# Patient Record
Sex: FEMALE | Race: WHITE | NOT HISPANIC OR LATINO | Employment: OTHER | URBAN - METROPOLITAN AREA
[De-identification: names, ages, dates, MRNs, and addresses within clinical notes are randomized per-mention and may not be internally consistent; named-entity substitution may affect disease eponyms.]

---

## 2017-03-28 ENCOUNTER — APPOINTMENT (EMERGENCY)
Dept: RADIOLOGY | Facility: HOSPITAL | Age: 67
End: 2017-03-28
Payer: MEDICARE

## 2017-03-28 ENCOUNTER — APPOINTMENT (EMERGENCY)
Dept: CT IMAGING | Facility: HOSPITAL | Age: 67
End: 2017-03-28
Payer: MEDICARE

## 2017-03-28 ENCOUNTER — HOSPITAL ENCOUNTER (EMERGENCY)
Facility: HOSPITAL | Age: 67
Discharge: HOME | End: 2017-03-28
Attending: EMERGENCY MEDICINE | Admitting: EMERGENCY MEDICINE
Payer: MEDICARE

## 2017-03-28 VITALS
HEIGHT: 64 IN | WEIGHT: 165 LBS | DIASTOLIC BLOOD PRESSURE: 53 MMHG | SYSTOLIC BLOOD PRESSURE: 100 MMHG | RESPIRATION RATE: 12 BRPM | HEART RATE: 79 BPM | OXYGEN SATURATION: 96 % | TEMPERATURE: 100.3 F | BODY MASS INDEX: 28.17 KG/M2

## 2017-03-28 DIAGNOSIS — N39.0 ACUTE UTI: Primary | ICD-10-CM

## 2017-03-28 LAB
ALBUMIN SERPL BCP-MCNC: 3.4 G/DL (ref 3.5–5)
ALP SERPL-CCNC: 55 U/L (ref 46–116)
ALT SERPL W P-5'-P-CCNC: 32 U/L (ref 12–78)
ANION GAP SERPL CALCULATED.3IONS-SCNC: 10 MMOL/L (ref 4–13)
AST SERPL W P-5'-P-CCNC: 19 U/L (ref 5–45)
ATRIAL RATE: 82 BPM
BACTERIA UR QL AUTO: ABNORMAL /HPF
BASOPHILS # BLD AUTO: 0.02 THOUSANDS/ΜL (ref 0–0.1)
BASOPHILS NFR BLD AUTO: 0 % (ref 0–1)
BILIRUB DIRECT SERPL-MCNC: 0.24 MG/DL (ref 0–0.2)
BILIRUB SERPL-MCNC: 1 MG/DL (ref 0.2–1)
BILIRUB UR QL STRIP: NEGATIVE
BUN SERPL-MCNC: 9 MG/DL (ref 5–25)
CALCIUM SERPL-MCNC: 8.5 MG/DL (ref 8.3–10.1)
CHLORIDE SERPL-SCNC: 99 MMOL/L (ref 100–108)
CLARITY UR: ABNORMAL
CO2 SERPL-SCNC: 26 MMOL/L (ref 21–32)
COLOR UR: YELLOW
COLOR, POC: ABNORMAL
CREAT SERPL-MCNC: 0.61 MG/DL (ref 0.6–1.3)
EOSINOPHIL # BLD AUTO: 0 THOUSAND/ΜL (ref 0–0.61)
EOSINOPHIL NFR BLD AUTO: 0 % (ref 0–6)
ERYTHROCYTE [DISTWIDTH] IN BLOOD BY AUTOMATED COUNT: 12.5 % (ref 11.6–15.1)
EXT BILIRUBIN, UA: NEGATIVE
EXT BLOOD URINE: ABNORMAL
EXT GLUCOSE, UA: NEGATIVE
EXT KETONES: ABNORMAL
EXT NITRITE, UA: NEGATIVE
EXT PH, UA: 6
EXT PROTEIN, UA: ABNORMAL
EXT SPECIFIC GRAVITY, UA: 1.02
EXT UROBILINOGEN: 0.2
GFR SERPL CREATININE-BSD FRML MDRD: >60 ML/MIN/1.73SQ M
GLUCOSE SERPL-MCNC: 100 MG/DL (ref 65–140)
GLUCOSE UR STRIP-MCNC: NEGATIVE MG/DL
HCT VFR BLD AUTO: 41.9 % (ref 34.8–46.1)
HGB BLD-MCNC: 14.8 G/DL (ref 11.5–15.4)
HGB UR QL STRIP.AUTO: ABNORMAL
KETONES UR STRIP-MCNC: ABNORMAL MG/DL
LEUKOCYTE ESTERASE UR QL STRIP: NEGATIVE
LYMPHOCYTES # BLD AUTO: 0.61 THOUSANDS/ΜL (ref 0.6–4.47)
LYMPHOCYTES NFR BLD AUTO: 8 % (ref 14–44)
MCH RBC QN AUTO: 35.2 PG (ref 26.8–34.3)
MCHC RBC AUTO-ENTMCNC: 35.3 G/DL (ref 31.4–37.4)
MCV RBC AUTO: 100 FL (ref 82–98)
MONOCYTES # BLD AUTO: 0.7 THOUSAND/ΜL (ref 0.17–1.22)
MONOCYTES NFR BLD AUTO: 9 % (ref 4–12)
NEUTROPHILS # BLD AUTO: 6.79 THOUSANDS/ΜL (ref 1.85–7.62)
NEUTS SEG NFR BLD AUTO: 83 % (ref 43–75)
NITRITE UR QL STRIP: POSITIVE
NON-SQ EPI CELLS URNS QL MICRO: ABNORMAL /HPF
NRBC BLD AUTO-RTO: 0 /100 WBCS
P AXIS: 58 DEGREES
PH UR STRIP.AUTO: 6 [PH] (ref 4.5–8)
PLATELET # BLD AUTO: 156 THOUSANDS/UL (ref 149–390)
PMV BLD AUTO: 10.4 FL (ref 8.9–12.7)
POTASSIUM SERPL-SCNC: 3.6 MMOL/L (ref 3.5–5.3)
PR INTERVAL: 176 MS
PROT SERPL-MCNC: 6.9 G/DL (ref 6.4–8.2)
PROT UR STRIP-MCNC: NEGATIVE MG/DL
QRS AXIS: -7 DEGREES
QRSD INTERVAL: 104 MS
QT INTERVAL: 346 MS
QTC INTERVAL: 404 MS
RBC # BLD AUTO: 4.21 MILLION/UL (ref 3.81–5.12)
RBC #/AREA URNS AUTO: ABNORMAL /HPF
SODIUM SERPL-SCNC: 135 MMOL/L (ref 136–145)
SP GR UR STRIP.AUTO: 1.02 (ref 1–1.03)
T WAVE AXIS: 61 DEGREES
TROPONIN I SERPL-MCNC: <0.02 NG/ML
UROBILINOGEN UR QL STRIP.AUTO: 0.2 E.U./DL
VENTRICULAR RATE: 82 BPM
WBC # BLD AUTO: 8.16 THOUSAND/UL (ref 4.31–10.16)
WBC # BLD EST: ABNORMAL 10*3/UL
WBC #/AREA URNS AUTO: ABNORMAL /HPF
WBC CLUMPS # UR AUTO: PRESENT /UL

## 2017-03-28 PROCEDURE — 81002 URINALYSIS NONAUTO W/O SCOPE: CPT | Performed by: PHYSICIAN ASSISTANT

## 2017-03-28 PROCEDURE — 87086 URINE CULTURE/COLONY COUNT: CPT | Performed by: PHYSICIAN ASSISTANT

## 2017-03-28 PROCEDURE — 87186 SC STD MICRODIL/AGAR DIL: CPT | Performed by: PHYSICIAN ASSISTANT

## 2017-03-28 PROCEDURE — 93005 ELECTROCARDIOGRAM TRACING: CPT

## 2017-03-28 PROCEDURE — 84484 ASSAY OF TROPONIN QUANT: CPT | Performed by: PHYSICIAN ASSISTANT

## 2017-03-28 PROCEDURE — 96374 THER/PROPH/DIAG INJ IV PUSH: CPT

## 2017-03-28 PROCEDURE — 71020 HB CHEST X-RAY 2VW FRONTAL&LATL: CPT

## 2017-03-28 PROCEDURE — 80048 BASIC METABOLIC PNL TOTAL CA: CPT | Performed by: PHYSICIAN ASSISTANT

## 2017-03-28 PROCEDURE — 70450 CT HEAD/BRAIN W/O DYE: CPT

## 2017-03-28 PROCEDURE — 85025 COMPLETE CBC W/AUTO DIFF WBC: CPT | Performed by: PHYSICIAN ASSISTANT

## 2017-03-28 PROCEDURE — 81001 URINALYSIS AUTO W/SCOPE: CPT | Performed by: PHYSICIAN ASSISTANT

## 2017-03-28 PROCEDURE — 99285 EMERGENCY DEPT VISIT HI MDM: CPT

## 2017-03-28 PROCEDURE — 96361 HYDRATE IV INFUSION ADD-ON: CPT

## 2017-03-28 PROCEDURE — 80076 HEPATIC FUNCTION PANEL: CPT | Performed by: PHYSICIAN ASSISTANT

## 2017-03-28 PROCEDURE — 87077 CULTURE AEROBIC IDENTIFY: CPT | Performed by: PHYSICIAN ASSISTANT

## 2017-03-28 PROCEDURE — 36415 COLL VENOUS BLD VENIPUNCTURE: CPT | Performed by: PHYSICIAN ASSISTANT

## 2017-03-28 RX ORDER — ACETAMINOPHEN 325 MG/1
650 TABLET ORAL ONCE
Status: COMPLETED | OUTPATIENT
Start: 2017-03-28 | End: 2017-03-28

## 2017-03-28 RX ORDER — CEPHALEXIN 500 MG/1
500 CAPSULE ORAL 2 TIMES DAILY
Qty: 20 CAPSULE | Refills: 0 | Status: SHIPPED | OUTPATIENT
Start: 2017-03-28 | End: 2017-04-07

## 2017-03-28 RX ORDER — KETOROLAC TROMETHAMINE 30 MG/ML
15 INJECTION, SOLUTION INTRAMUSCULAR; INTRAVENOUS ONCE
Status: COMPLETED | OUTPATIENT
Start: 2017-03-28 | End: 2017-03-28

## 2017-03-28 RX ADMIN — SODIUM CHLORIDE 1000 ML: 0.9 INJECTION, SOLUTION INTRAVENOUS at 09:47

## 2017-03-28 RX ADMIN — KETOROLAC TROMETHAMINE 15 MG: 30 INJECTION, SOLUTION INTRAMUSCULAR at 09:47

## 2017-03-28 RX ADMIN — ACETAMINOPHEN 650 MG: 325 TABLET ORAL at 09:52

## 2017-03-28 RX ADMIN — SODIUM CHLORIDE 1000 ML: 0.9 INJECTION, SOLUTION INTRAVENOUS at 11:17

## 2017-03-30 LAB — BACTERIA UR CULT: NORMAL

## 2020-07-04 ENCOUNTER — APPOINTMENT (EMERGENCY)
Dept: RADIOLOGY | Facility: HOSPITAL | Age: 70
End: 2020-07-04
Payer: MEDICARE

## 2020-07-04 ENCOUNTER — HOSPITAL ENCOUNTER (EMERGENCY)
Facility: HOSPITAL | Age: 70
Discharge: HOME | End: 2020-07-04
Attending: EMERGENCY MEDICINE
Payer: MEDICARE

## 2020-07-04 ENCOUNTER — APPOINTMENT (EMERGENCY)
Dept: CT IMAGING | Facility: HOSPITAL | Age: 70
End: 2020-07-04
Payer: MEDICARE

## 2020-07-04 VITALS
HEART RATE: 71 BPM | RESPIRATION RATE: 18 BRPM | TEMPERATURE: 98.6 F | SYSTOLIC BLOOD PRESSURE: 156 MMHG | OXYGEN SATURATION: 99 % | DIASTOLIC BLOOD PRESSURE: 74 MMHG

## 2020-07-04 DIAGNOSIS — S09.90XA CHI (CLOSED HEAD INJURY): Primary | ICD-10-CM

## 2020-07-04 DIAGNOSIS — S42.032A TRAUMATIC CLOSED FRACTURE OF DISTAL CLAVICLE WITH MINIMAL DISPLACEMENT, LEFT, INITIAL ENCOUNTER: ICD-10-CM

## 2020-07-04 DIAGNOSIS — S22.42XA CLOSED FRACTURE OF MULTIPLE RIBS OF LEFT SIDE, INITIAL ENCOUNTER: ICD-10-CM

## 2020-07-04 PROCEDURE — 72125 CT NECK SPINE W/O DYE: CPT

## 2020-07-04 PROCEDURE — 70450 CT HEAD/BRAIN W/O DYE: CPT

## 2020-07-04 PROCEDURE — 73030 X-RAY EXAM OF SHOULDER: CPT

## 2020-07-04 PROCEDURE — 96374 THER/PROPH/DIAG INJ IV PUSH: CPT

## 2020-07-04 PROCEDURE — 71260 CT THORAX DX C+: CPT

## 2020-07-04 PROCEDURE — 90715 TDAP VACCINE 7 YRS/> IM: CPT | Performed by: EMERGENCY MEDICINE

## 2020-07-04 PROCEDURE — 99284 EMERGENCY DEPT VISIT MOD MDM: CPT

## 2020-07-04 PROCEDURE — 90471 IMMUNIZATION ADMIN: CPT

## 2020-07-04 PROCEDURE — 99284 EMERGENCY DEPT VISIT MOD MDM: CPT | Performed by: EMERGENCY MEDICINE

## 2020-07-04 RX ORDER — KETOROLAC TROMETHAMINE 30 MG/ML
15 INJECTION, SOLUTION INTRAMUSCULAR; INTRAVENOUS ONCE
Status: COMPLETED | OUTPATIENT
Start: 2020-07-04 | End: 2020-07-04

## 2020-07-04 RX ORDER — METHOCARBAMOL 500 MG/1
500 TABLET, FILM COATED ORAL 2 TIMES DAILY
Qty: 10 TABLET | Refills: 0 | Status: SHIPPED | OUTPATIENT
Start: 2020-07-04 | End: 2021-09-08

## 2020-07-04 RX ORDER — HYDROCODONE BITARTRATE AND ACETAMINOPHEN 5; 325 MG/1; MG/1
1 TABLET ORAL EVERY 6 HOURS PRN
Qty: 10 TABLET | Refills: 0 | Status: SHIPPED | OUTPATIENT
Start: 2020-07-04 | End: 2020-07-14

## 2020-07-04 RX ORDER — LIDOCAINE 50 MG/G
1 PATCH TOPICAL ONCE
Status: DISCONTINUED | OUTPATIENT
Start: 2020-07-04 | End: 2020-07-04 | Stop reason: HOSPADM

## 2020-07-04 RX ADMIN — IOHEXOL 85 ML: 350 INJECTION, SOLUTION INTRAVENOUS at 19:58

## 2020-07-04 RX ADMIN — TETANUS TOXOID, REDUCED DIPHTHERIA TOXOID AND ACELLULAR PERTUSSIS VACCINE, ADSORBED 0.5 ML: 5; 2.5; 8; 8; 2.5 SUSPENSION INTRAMUSCULAR at 19:36

## 2020-07-04 RX ADMIN — LIDOCAINE 1 PATCH: 50 PATCH TOPICAL at 21:22

## 2020-07-04 RX ADMIN — KETOROLAC TROMETHAMINE 15 MG: 30 INJECTION, SOLUTION INTRAMUSCULAR at 19:37

## 2020-07-05 NOTE — ED PROVIDER NOTES
History  Chief Complaint   Patient presents with   Vernona Merlin     pt post trip and fall onto the pavement about 3 feet down, L shoulder, head, L ribs, pt denies LOC or thinners      77 y/o female presents to the ED for evaluation after a mechanical fall that occurred earlier today  Patient states that she tripped over something in her yard and fell onto concrete on her left side  She states that she did hit her head but denies LOC  She c/o headache, left shoulder pain, and left rib pain  She denies any neck pain, n/t/w of her extremities, abd pain, sob, n/v, or vision changes  Denies any thinners or aspirin use  No other complaints  History provided by:  Patient  Fall   Mechanism of injury: fall    Injury location:  Head/neck, shoulder/arm and torso  Head/neck injury location:  Head  Shoulder/arm injury location:  L shoulder  Torso injury location:  L chest  Incident location:  Outdoors  Arrived directly from scene: yes    Fall:     Fall occurred:  Standing    Impact surface:  [place]    Point of impact:  Head  Suspicion of alcohol use: no    Suspicion of drug use: no    Tetanus status:  Unknown  Prior to arrival data:     Bystander interventions:  None    Patient ambulatory at scene: yes      Blood loss:  None    Responsiveness at scene:  Alert    Orientation at scene:  Person, place, situation and time    Loss of consciousness: no      Amnesic to event: no      Airway interventions:  None    Breathing interventions:  None    IV access status:  None    IO access:  None    Fluids administered:  None    Cardiac interventions:  None    Medications administered:  None    Immobilization:  None  Associated symptoms: chest pain and headaches    Associated symptoms: no abdominal pain, no back pain, no loss of consciousness, no nausea, no neck pain and no vomiting    Risk factors: no anticoagulation therapy        None       History reviewed  No pertinent past medical history  History reviewed   No pertinent surgical history  History reviewed  No pertinent family history  I have reviewed and agree with the history as documented  E-Cigarette/Vaping     E-Cigarette/Vaping Substances     Social History     Tobacco Use    Smoking status: Never Smoker   Substance Use Topics    Alcohol use: Yes     Alcohol/week: 7 0 standard drinks     Types: 7 Glasses of wine per week    Drug use: No       Review of Systems   Constitutional: Negative for chills and fever  HENT: Negative for congestion, ear pain and sore throat  Eyes: Negative for pain and visual disturbance  Respiratory: Negative for cough, shortness of breath and wheezing  Cardiovascular: Positive for chest pain  Negative for leg swelling  Gastrointestinal: Negative for abdominal pain, diarrhea, nausea and vomiting  Genitourinary: Negative for dysuria, frequency, hematuria and urgency  Musculoskeletal: Negative for back pain, neck pain and neck stiffness  Skin: Negative for rash and wound  Neurological: Positive for headaches  Negative for loss of consciousness, weakness and numbness  Psychiatric/Behavioral: Negative for agitation and confusion  All other systems reviewed and are negative  Physical Exam  Physical Exam   Constitutional: She is oriented to person, place, and time  She appears well-developed and well-nourished  HENT:   Head: Normocephalic and atraumatic  Eyes: Pupils are equal, round, and reactive to light  EOM are normal    Neck: Normal range of motion  Neck supple  No midline C, T, or L spine tenderness  No bony stepoffs    Cardiovascular: Normal rate and regular rhythm  Pulmonary/Chest: Effort normal and breath sounds normal  No stridor  No respiratory distress  She has no wheezes  She has no rales  She exhibits tenderness  Left lateral chest wall tenderness  No abrasions or ecchymosis    Abdominal: Soft  Bowel sounds are normal  She exhibits no distension  There is no tenderness     Musculoskeletal: Normal range of motion  She exhibits tenderness  Tenderness over the left distal clavicle  Abrasions over the left shoulder    Neurological: She is alert and oriented to person, place, and time  No focal deficits   Skin: Skin is warm and dry  Nursing note and vitals reviewed  Vital Signs  ED Triage Vitals   Temperature Pulse Respirations Blood Pressure SpO2   07/04/20 1748 07/04/20 1747 07/04/20 1747 07/04/20 1747 07/04/20 1747   98 6 °F (37 °C) 75 19 153/74 99 %      Temp Source Heart Rate Source Patient Position - Orthostatic VS BP Location FiO2 (%)   07/04/20 1748 07/04/20 1747 07/04/20 1747 07/04/20 1747 --   Temporal Monitor Sitting Right arm       Pain Score       --                  Vitals:    07/04/20 1747 07/04/20 1930 07/04/20 2023 07/04/20 2100   BP: 153/74 149/75 168/77 156/74   Pulse: 75 68 76 71   Patient Position - Orthostatic VS: Sitting   Lying         Visual Acuity  Visual Acuity      Most Recent Value   L Pupil Size (mm)  3   R Pupil Size (mm)  3          ED Medications  Medications   lidocaine (LIDODERM) 5 % patch 1 patch (has no administration in time range)   ketorolac (TORADOL) injection 15 mg (15 mg Intravenous Given 7/4/20 1937)   tetanus-diphtheria-acellular pertussis (BOOSTRIX) IM injection 0 5 mL (0 5 mL Intramuscular Given 7/4/20 1936)   iohexol (OMNIPAQUE) 350 MG/ML injection (MULTI-DOSE) 85 mL (85 mL Intravenous Given 7/4/20 1958)       Diagnostic Studies  Results Reviewed     None                 XR shoulder 2+ views LEFT   ED Interpretation by Hugo Simmons DO (07/04 2050)   Distal clavicle fracture        CT head without contrast   ED Interpretation by Hugo Simmons DO (07/04 2049)   No acute intracranial abnormality  Final Result by Dori Bhat DO (07/04 2028)      No acute intracranial abnormality                    Workstation performed: [id number]         CT spine cervical without contrast   ED Interpretation by Hugo Simmons DO (07/04 2049)   No cervical spine fracture or traumatic malalignment  Final Result by Ata Bates DO (07/04 2034)      No cervical spine fracture or traumatic malalignment  Workstation performed: [id number]         CT chest with contrast   ED Interpretation by Farhad Flood DO (07/04 2049)   Right 4th and 5th anterior rib fractures  Left distal clavicular fracture           Final Result by Daniel Castillo MD (07/04 2040)      Right 4th and 5th anterior rib fractures  Left distal clavicular fracture  Workstation performed: [id number]                    Procedures  Procedures         ED Course       US AUDIT      Most Recent Value   Initial Alcohol Screen: US AUDIT-C    1  How often do you have a drink containing alcohol? 1 Filed at: 07/04/2020 1905   2  How many drinks containing alcohol do you have on a typical day you are drinking? 0 Filed at: 07/04/2020 1905   3b  FEMALE Any Age, or MALE 65+: How often do you have 4 or more drinks on one occassion? 0 Filed at: 07/04/2020 1905   Audit-C Score  1 Filed at: 07/04/2020 1905                  MISBAH/DAST-10      Most Recent Value   How many times in the past year have you    Used an illegal drug or used a prescription medication for non-medical reasons? Never Filed at: 07/04/2020 1905                                MDM  Number of Diagnoses or Management Options  CHI (closed head injury): new and requires workup  Closed fracture of multiple ribs of left side, initial encounter: new and requires workup  Traumatic closed fracture of distal clavicle with minimal displacement, left, initial encounter: new and requires workup  Diagnosis management comments: Patient with multiple complaints s/p fall- will get ct scans and shoulder xray  Will give pain meds and reassess  Patient reevaluated and feels improved  Patient updated on results of tests  Discharge instructions given including medications, follow-up, and return precautions   Patient demonstrates verbal understanding and agrees with plan  Narcotic and muscle relaxant precautions given  Patient provided with incentive spirometer and instructed to use this frequently  Amount and/or Complexity of Data Reviewed  Clinical lab tests: ordered and reviewed  Tests in the radiology section of CPT®: ordered and reviewed  Tests in the medicine section of CPT®: ordered and reviewed  Discussion of test results with the performing providers: yes  Decide to obtain previous medical records or to obtain history from someone other than the patient: yes  Obtain history from someone other than the patient: yes  Review and summarize past medical records: yes  Discuss the patient with other providers: yes  Independent visualization of images, tracings, or specimens: yes    Patient Progress  Patient progress: improved        Disposition  Final diagnoses:   CHI (closed head injury)   Traumatic closed fracture of distal clavicle with minimal displacement, left, initial encounter   Closed fracture of multiple ribs of left side, initial encounter     Time reflects when diagnosis was documented in both MDM as applicable and the Disposition within this note     Time User Action Codes Description Comment    7/4/2020  9:09 PM [address] [place] [S09 90XA] Wilfred Shrestha 136 (closed head injury)     7/4/2020  9:09 PM Heath Ramirez Add [S42 032A] Traumatic closed fracture of distal clavicle with minimal displacement, left, initial encounter     7/4/2020  9:10 PM Heath Ramirez Add [S22 42XA] Closed fracture of multiple ribs of left side, initial encounter       ED Disposition     ED Disposition Condition Date/Time Comment    Discharge Stable Sat Jul 4, 2020  9:09 PM [facility] May discharge to home/self care              Follow-up Information     Follow up With Specialties Details Why Contact Info Additional [address] [facility] Call in 1 day for follow up within 1 week 819 [facility] [address] [address], [address] [address] [facility], [place], [place], [address] Emergency Department Emergency Medicine Go to  immediately for any new or worsening symptoms  [address] [facility] [id number]  [address] [place] [address] ED, 819 [facility], [place], [address]    Jenni Luis MD Internal Medicine Call in 2 days for follow up within 1 week [address] [place] [address]  [phone]             Patient's Medications   Discharge Prescriptions    HYDROCODONE-ACETAMINOPHEN (NORCO) 5-325 MG PER TABLET    Take 1 tablet by mouth every 6 (six) hours as needed for pain for up to 10 daysMax Daily Amount: 4 tablets       Start Date: 7/4/2020  End Date: 7/14/2020       Order Dose: 1 tablet       Quantity: 10 tablet    Refills: 0    METHOCARBAMOL (ROBAXIN) 500 MG TABLET    Take 1 tablet (500 mg total) by mouth 2 (two) times a day       Start Date: 7/4/2020  End Date: --       Order Dose: 500 mg       Quantity: 10 tablet    Refills: 0     No discharge procedures on file      PDMP Review     None          ED Provider  Electronically Signed by           Destiney Hamilton DO  07/04/20 1809

## 2020-07-06 ENCOUNTER — TELEPHONE (OUTPATIENT)
Dept: OBGYN CLINIC | Facility: HOSPITAL | Age: 70
End: 2020-07-06

## 2020-07-06 NOTE — TELEPHONE ENCOUNTER
Patient is calling to make an appt for a clavical fx in the [place] office  The schedule is full - emailed practice admin

## 2020-07-07 ENCOUNTER — APPOINTMENT (OUTPATIENT)
Dept: RADIOLOGY | Facility: CLINIC | Age: 70
End: 2020-07-07
Payer: MEDICARE

## 2020-07-07 ENCOUNTER — OFFICE VISIT (OUTPATIENT)
Dept: OBGYN CLINIC | Facility: CLINIC | Age: 70
End: 2020-07-07
Payer: MEDICARE

## 2020-07-07 VITALS
WEIGHT: 175.2 LBS | DIASTOLIC BLOOD PRESSURE: 66 MMHG | HEART RATE: 51 BPM | SYSTOLIC BLOOD PRESSURE: 97 MMHG | BODY MASS INDEX: 29.91 KG/M2 | HEIGHT: 64 IN | RESPIRATION RATE: 20 BRPM

## 2020-07-07 DIAGNOSIS — S42.032A CLOSED DISPLACED FRACTURE OF ACROMIAL END OF LEFT CLAVICLE, INITIAL ENCOUNTER: Primary | ICD-10-CM

## 2020-07-07 DIAGNOSIS — M25.512 LEFT SHOULDER PAIN, UNSPECIFIED CHRONICITY: ICD-10-CM

## 2020-07-07 PROCEDURE — 99203 OFFICE O/P NEW LOW 30 MIN: CPT | Performed by: ORTHOPAEDIC SURGERY

## 2020-07-07 PROCEDURE — 73000 X-RAY EXAM OF COLLAR BONE: CPT

## 2020-07-07 RX ORDER — ECHINACEA 400 MG
CAPSULE ORAL
COMMUNITY
Start: 2010-12-08

## 2020-07-07 RX ORDER — PERPHENAZINE/AMITRIPTYLINE HCL 4MG-10MG
TABLET ORAL
COMMUNITY
Start: 2010-12-08

## 2020-07-07 RX ORDER — CALCIUM AMINO ACID CHELATE 30 %
GRANULES (GRAM) MISCELLANEOUS
COMMUNITY
Start: 2010-12-08 | End: 2021-09-08

## 2020-07-07 RX ORDER — MULTIVITAMIN/IRON/FOLIC ACID 18MG-0.4MG
TABLET ORAL
COMMUNITY
Start: 2010-12-08

## 2020-07-07 RX ORDER — CHOLECALCIFEROL (VITAMIN D3) 100000/G
POWDER (GRAM) MISCELLANEOUS DAILY
COMMUNITY
Start: 2010-12-08

## 2020-07-07 NOTE — PROGRESS NOTES
HPI:  Patient is a 78y o  year old RHD female  who presents with chief complaint of left shoulder pain  Pt sustained injury from a fall in her backyard 3 ft down onto concrete landing on her left side on 07/04/2020  Patient was seen in the emergency department where x-rays were performed and patient was provided with a sling  Today patient states that she has pain on her left side coupled with bruising  Pt presents to the office in sling  ROS:   General: No fever, no chills, no weight loss, no weight gain  HEENT:  No loss of hearing, no nose bleeds, no sore throat  Eyes:  No eye pain, no red eyes, no visual disturbance  Respiratory:  No cough, no shortness of breath, no wheezing  Cardiovascular:  No chest pain, no palpitations, no edema  GI: No abdominal pain, no nausea, no vomiting  Endocrine: No frequent urination, no excessive thirst  Urinary:  No dysuria, no hematuria, no incontinence  Musculoskeletal: see HPI and PE  Skin:  No rash, no wounds  Neurological:  No dizziness, no headache, no numbness  Psychiatric:  No difficulty concentrating, no depression, no suicide thoughts, no anxiety  Review of all other systems is negative    PMH:  Past Medical History:   Diagnosis Date    Disease of thyroid gland     Fractures     Rheumatoid arthritis (HCC)        PSH:  History reviewed  No pertinent surgical history      Medications:  Current Outpatient Medications   Medication Sig Dispense Refill    Calcium Amino Acid Chelate 30 % GRAN take one daily      Cholecalciferol (VITAMIN D3) 263975 UNIT/GM POWD Take by mouth Daily      Coconut Oil 1000 MG CAPS daily      Coenzyme Q10 (COQ-10) 10 MG CAPS daily      Flaxseed, Linseed, (FLAXSEED OIL) 1000 MG CAPS daily      Glucosamine-Chondroitin 5164-0084 MG/30ML LIQD Take by mouth      methocarbamol (ROBAXIN) 500 mg tablet Take 1 tablet (500 mg total) by mouth 2 (two) times a day 10 tablet 0    Milk Thistle 1000 MG CAPS daily      HYDROcodone-acetaminophen (NORCO) 5-325 mg per tablet Take 1 tablet by mouth every 6 (six) hours as needed for pain for up to 10 daysMax Daily Amount: 4 tablets (Patient not taking: Reported on 7/7/2020) 10 tablet 0     No current facility-administered medications for this visit  Allergies: Allergies   Allergen Reactions    Codeine GI Intolerance    Prednisone Other (See Comments)     Shaking and hallucinations       Family History:  Family History   Problem Relation Age of Onset    Cancer Mother     Arthritis Mother     Cancer Father     Kidney cancer Sister     Diabetes Sister     Heart attack Brother        Social History:  Social History     Occupational History    Not on file   Tobacco Use    Smoking status: Never Smoker    Smokeless tobacco: Never Used   Substance and Sexual Activity    Alcohol use: Yes     Alcohol/week: 7 0 standard drinks     Types: 7 Glasses of wine per week    Drug use: No    Sexual activity: Not on file       Physical Exam:  General :  Alert, cooperative, no distress, appears stated age  Blood pressure 97/66, pulse (!) 51, resp  rate 20, height 5' 4" (1 626 m), weight 79 5 kg (175 lb 3 2 oz)  Head:  Normocephalic, without obvious abnormality, atraumatic   Eyes:  Conjunctiva/corneas clear, EOM's intact,   Ears: Both ears normal appearance, no hearing deficits  Nose: Nares normal, septum midline, no drainage    Neck: Supple,  trachea midline, no adenopathy, no tenderness, no mass   Back:   Symmetric, no curvature, ROM normal, no tenderness   Lungs:   Respirations unlabored   Chest Wall:  No tenderness or deformity   Extremities: Extremities normal, atraumatic, no cyanosis or edema      Pulses: 2+ and symmetric   Skin: Skin color, texture, turgor normal, no rashes or lesions      Neurologic: Normal           Left Shoulder Exam     Comments:  Palpation over fracture site (distal clavicle)  Good motion of wrist and fingers            Imaging Studies:      The following imaging studies were reviewed in office today  My findings are noted  X-rays left clavicle performed today show mildly displaced left distal clavicle fracture  Not interarticular  Assessment  Encounter Diagnosis   Name Primary?  Closed displaced fracture of acromial end of left clavicle, initial encounter Yes         Plan:    After reviewing x-rays patient was advised that her distal left-sided clavicle fracture is stable and can continue sling  Patient was advised to remove sling to work on pendulum exercises the shoulder as shown in the office as well as motion of her wrist elbow and fingers  Patient will follow up in the office for repeat x-rays in 2 weeks to continue to monitor for maintained alignment      Scribe Attestation    I,:   Kera Santana am acting as a scribe while in the presence of the attending physician :        I,:   Myla Owusu MD personally performed the services described in this documentation    as scribed in my presence :

## 2020-07-22 ENCOUNTER — OFFICE VISIT (OUTPATIENT)
Dept: OBGYN CLINIC | Facility: CLINIC | Age: 70
End: 2020-07-22
Payer: MEDICARE

## 2020-07-22 ENCOUNTER — APPOINTMENT (OUTPATIENT)
Dept: RADIOLOGY | Facility: CLINIC | Age: 70
End: 2020-07-22
Payer: MEDICARE

## 2020-07-22 VITALS
RESPIRATION RATE: 18 BRPM | WEIGHT: 175 LBS | HEIGHT: 64 IN | HEART RATE: 71 BPM | BODY MASS INDEX: 29.88 KG/M2 | TEMPERATURE: 96.9 F | SYSTOLIC BLOOD PRESSURE: 123 MMHG | DIASTOLIC BLOOD PRESSURE: 74 MMHG

## 2020-07-22 DIAGNOSIS — S42.032D CLOSED DISPLACED FRACTURE OF ACROMIAL END OF LEFT CLAVICLE WITH ROUTINE HEALING, SUBSEQUENT ENCOUNTER: Primary | ICD-10-CM

## 2020-07-22 DIAGNOSIS — S42.032D CLOSED DISPLACED FRACTURE OF ACROMIAL END OF LEFT CLAVICLE WITH ROUTINE HEALING, SUBSEQUENT ENCOUNTER: ICD-10-CM

## 2020-07-22 PROCEDURE — 99213 OFFICE O/P EST LOW 20 MIN: CPT | Performed by: ORTHOPAEDIC SURGERY

## 2020-07-22 PROCEDURE — 73000 X-RAY EXAM OF COLLAR BONE: CPT

## 2020-07-22 NOTE — PROGRESS NOTES
Chief Complaint   Patient presents with    Left Shoulder - Follow-up, Pain         Subjective   Patient here following up regarding her left clavicle fracture  She fell July 4, 2020 and has been treating this non surgically  Patient enters today for new x-rays  Pain has been well controlled  She has been using a sling  She has not been using the left upper extremity  Denies any numbness or tingling left upper extremity  ROS:  Review of systems form reviewed July 22, 2020  General: no fever, no chills  Respiratory:  No coughing, shortness of breath or wheezing  Cardiovascular:  No chest pain, no palpitations  Musculoskeletal: see HPI and PE  SKIN:  No skin rash, no dry skin  Neurological:  No headaches, no confusion  Psychiatric:  No suicide thoughts, no anxiety, no depression  Review of all other systems is negative    Past Medical History:   Diagnosis Date    Disease of thyroid gland     Fractures     Rheumatoid arthritis ([facility] [address] )        Current Outpatient Medications on File Prior to Visit   Medication Sig Dispense Refill    Calcium Amino Acid Chelate 30 % GRAN take one daily      Cholecalciferol (VITAMIN D3) 053198 UNIT/GM POWD Take by mouth Daily      Coconut Oil 1000 MG CAPS daily      Coenzyme Q10 (COQ-10) 10 MG CAPS daily      Flaxseed, Linseed, (FLAXSEED OIL) 1000 MG CAPS daily      Glucosamine-Chondroitin 5592-4045 MG/30ML LIQD Take by mouth      methocarbamol (ROBAXIN) 500 mg tablet Take 1 tablet (500 mg total) by mouth 2 (two) times a day 10 tablet 0    Milk Thistle 1000 MG CAPS daily       No current facility-administered medications on file prior to visit          Allergies   Allergen Reactions    Codeine GI Intolerance    Prednisone Other (See Comments)     Shaking and hallucinations         Physical Exam:    Vitals:    07/22/20 1602   BP: 123/74   Pulse: 71   Resp: 18   Temp: (!) 96 9 °F (36 1 °C)   TempSrc: Temporal   Weight: 79 4 kg (175 lb)   Height: 5' 4" (1 626 m) General Appearance:  Alert, cooperative, no distress, appears stated age   Lungs:   respirations unlabored   Heart:  Normal heart rate noted   Abdomen:   Soft, non-tender,  no masses   Extremities: Extremities normal, atraumatic, no cyanosis or edema   Pulses: 2+ and symmetric   Skin: Skin color, texture, turgor normal, no rashes or lesions   Neurologic: Normal         Ortho Exam  Examination left shoulder skin intact no erythema  Range of motion and strength left shoulder deferred  Full active range of motion left elbow, wrist and hand  Sensation is intact light touch left upper extremity    Imaging  X-rays July 22, 2020 left shoulder shows minimally displaced (3-4 mm) fracture lateral aspect left clavicle stable compared to prior x-rays    ASSESSMENT:    Geneva Klein was seen today for follow-up and pain  Diagnoses and all orders for this visit:    Closed displaced fracture of acromial end of left clavicle with routine healing, subsequent encounter  -     XR clavicle left; Future          PLAN:  Mildly displaced left distal clavicle fracture  Doctor [facility] discussed surgical and nonsurgical options with the patient again today  Patient opted for nonsurgical treatment  She will continue using the sling just doing pendulums and range of motion of the left elbow wrist and hand  Follow up in two weeks for another x-ray of left clavicle      Scribe Attestation    I,:   Adam Mckinney PA-C am acting as a scribe while in the presence of the attending physician :        I,:   Isaak Hinojosa MD personally performed the services described in this documentation    as scribed in my presence :

## 2020-08-06 ENCOUNTER — OFFICE VISIT (OUTPATIENT)
Dept: OBGYN CLINIC | Facility: CLINIC | Age: 70
End: 2020-08-06
Payer: MEDICARE

## 2020-08-06 ENCOUNTER — APPOINTMENT (OUTPATIENT)
Dept: RADIOLOGY | Facility: CLINIC | Age: 70
End: 2020-08-06
Payer: MEDICARE

## 2020-08-06 VITALS
WEIGHT: 178 LBS | SYSTOLIC BLOOD PRESSURE: 131 MMHG | BODY MASS INDEX: 30.39 KG/M2 | RESPIRATION RATE: 20 BRPM | DIASTOLIC BLOOD PRESSURE: 80 MMHG | HEIGHT: 64 IN | TEMPERATURE: 97.6 F | HEART RATE: 71 BPM

## 2020-08-06 DIAGNOSIS — S42.032D CLOSED DISPLACED FRACTURE OF ACROMIAL END OF LEFT CLAVICLE WITH ROUTINE HEALING, SUBSEQUENT ENCOUNTER: ICD-10-CM

## 2020-08-06 DIAGNOSIS — S42.032D CLOSED DISPLACED FRACTURE OF ACROMIAL END OF LEFT CLAVICLE WITH ROUTINE HEALING, SUBSEQUENT ENCOUNTER: Primary | ICD-10-CM

## 2020-08-06 PROCEDURE — 73000 X-RAY EXAM OF COLLAR BONE: CPT

## 2020-08-06 PROCEDURE — 99213 OFFICE O/P EST LOW 20 MIN: CPT | Performed by: ORTHOPAEDIC SURGERY

## 2020-08-06 NOTE — PROGRESS NOTES
SUBJECTIVE: Patient is a 79 yo female 4 5 weeks s/p Left distal clavicle fracture  Patient has been and would like to continue conservative treatment  She reports she as been wearing the sling to bed and throughout her day  She has been removing it for exercises of elbow, wrist and hand and occasionally when she is at work  Patient is a  and has been back to work part time  She has been doing AROM exercises and overhead motions because of her job  Reports she has some pain with holding her Left arm abducted and flexed for a prolonged period of time  She has also been using her daughters bone stimulator  She reports a history of rotator cuff pathology in Left shoulder  Reports she had good ROM prior to clavicle fracture  Denies numbness or tingling Left upper extremity  Patient offers no additional complaints or concerns at this time  ROS:   General: No fever, no chills, no weight loss, no weight gain  HEENT:  No loss of hearing, no nose bleeds, no sore throat  Eyes:  No eye pain, no red eyes, no visual disturbance  Respiratory:  No cough, no shortness of breath, no wheezing  Cardiovascular:  No chest pain, no palpitations, no edema  GI: No abdominal pain, no nausea, no vomiting  Endocrine: No frequent urination, no excessive thirst  Urinary:  No dysuria, no hematuria, no incontinence  Musculoskeletal: see HPI and PE  Skin:  No rash, no wounds  Neurological:  No dizziness, no headache, no numbness  Psychiatric:  No difficulty concentrating, no depression, no suicide thoughts, no anxiety  Review of all other systems is negative    PMH:  Past Medical History:   Diagnosis Date    Disease of thyroid gland     Fractures     Rheumatoid arthritis (HCC)        PSH:  History reviewed  No pertinent surgical history      Medications:  Current Outpatient Medications   Medication Sig Dispense Refill    Calcium Amino Acid Chelate 30 % GRAN take one daily      Cholecalciferol (VITAMIN D3) 616339 UNIT/GM POWD Take by mouth Daily      Coconut Oil 1000 MG CAPS daily      Coenzyme Q10 (COQ-10) 10 MG CAPS daily      Flaxseed, Linseed, (FLAXSEED OIL) 1000 MG CAPS daily      Glucosamine-Chondroitin 8058-0059 MG/30ML LIQD Take by mouth      methocarbamol (ROBAXIN) 500 mg tablet Take 1 tablet (500 mg total) by mouth 2 (two) times a day 10 tablet 0    Milk Thistle 1000 MG CAPS daily       No current facility-administered medications for this visit  Allergies: Allergies   Allergen Reactions    Codeine GI Intolerance    Prednisone Other (See Comments)     Shaking and hallucinations       Family History:  Family History   Problem Relation Age of Onset    Cancer Mother     Arthritis Mother     Cancer Father     Kidney cancer Sister     Diabetes Sister     Heart attack Brother        Social History:  Social History     Occupational History    Not on file   Tobacco Use    Smoking status: Never Smoker    Smokeless tobacco: Never Used   Substance and Sexual Activity    Alcohol use: Yes     Alcohol/week: 7 0 standard drinks     Types: 7 Glasses of wine per week    Drug use: No    Sexual activity: Not on file       Physical Exam:  General :  Alert, cooperative, no distress, appears stated age  Blood pressure 131/80, pulse 71, temperature 97 6 °F (36 4 °C), temperature source Temporal, resp  rate 20, height 5' 4" (1 626 m), weight 80 7 kg (178 lb)  Head:  Normocephalic, without obvious abnormality, atraumatic   Eyes:  Conjunctiva/corneas clear, EOM's intact,   Ears: Both ears normal appearance, no hearing deficits      Nose: Nares normal, septum midline, no drainage    Neck: Supple,  trachea midline, no adenopathy, no tenderness, no mass   Back:   Symmetric, no curvature, ROM normal, no tenderness   Lungs:   Respirations unlabored   Chest Wall:  No tenderness or deformity   Extremities: Extremities normal, atraumatic, no cyanosis or edema      Pulses: 2+ and symmetric   Skin: Skin color, texture, turgor normal, no rashes or lesions      Neurologic: Normal             Ortho Exam  Left upper extremity  · Skin intact without ecchymosis, erythema or deformity noted  · Mild tenderness to palpation at fracture site   · AROM 0-90 shoulder ABD and flexion with mild pain  · Full AROM elbow, wrist and fingers   · Strength deferred   · Sensation intact to axillary, musculocutaneous, radial, median and ulnar distributions   · +2 radial pulse palpated       Imaging Studies: The following imaging studies were reviewed in office today  My findings are noted  Xrays taken 08/06/2020 of Left clavicle demonstrate good alignment of distal clavicle fracture with minimal callus formation noted  Assessment  Encounter Diagnosis   Name Primary?  Closed displaced fracture of acromial end of left clavicle with routine healing, subsequent encounter Yes           Plan: 79 yo female about 4 5 weeks s/p Left clavicle fracture   - Nonweightbearing Left upper extremity   - Patient wants to continue conservative treatment at this time  - Can use ice/heat, Tylenol, ibuprofen for pain relief  Continue to wear the sling, especially when out and for hygenic purposes   Pendulum exercises, ROM exercises of elbow, wrist and fingers   - Referral to PT for gentle ROM of Left upper extremity, no strengthening or weightbearing Left upper extremity    - Use caution when at work, no lifting with Left upper extremity    - Follow up in 2 weeks for reevaluation and new xrays

## 2020-08-19 ENCOUNTER — OFFICE VISIT (OUTPATIENT)
Dept: OBGYN CLINIC | Facility: CLINIC | Age: 70
End: 2020-08-19
Payer: MEDICARE

## 2020-08-19 ENCOUNTER — APPOINTMENT (OUTPATIENT)
Dept: RADIOLOGY | Facility: CLINIC | Age: 70
End: 2020-08-19
Payer: MEDICARE

## 2020-08-19 VITALS
BODY MASS INDEX: 29.88 KG/M2 | SYSTOLIC BLOOD PRESSURE: 139 MMHG | TEMPERATURE: 97.4 F | DIASTOLIC BLOOD PRESSURE: 89 MMHG | WEIGHT: 175 LBS | RESPIRATION RATE: 20 BRPM | HEART RATE: 66 BPM | HEIGHT: 64 IN

## 2020-08-19 DIAGNOSIS — S42.032D CLOSED DISPLACED FRACTURE OF ACROMIAL END OF LEFT CLAVICLE WITH ROUTINE HEALING, SUBSEQUENT ENCOUNTER: Primary | ICD-10-CM

## 2020-08-19 DIAGNOSIS — S42.032D CLOSED DISPLACED FRACTURE OF ACROMIAL END OF LEFT CLAVICLE WITH ROUTINE HEALING, SUBSEQUENT ENCOUNTER: ICD-10-CM

## 2020-08-19 PROCEDURE — 99213 OFFICE O/P EST LOW 20 MIN: CPT | Performed by: ORTHOPAEDIC SURGERY

## 2020-08-19 PROCEDURE — 73000 X-RAY EXAM OF COLLAR BONE: CPT

## 2020-08-19 NOTE — PROGRESS NOTES
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  She has been using daughters bone stimulator and using sling at night  She has been nonweightbearing Right upper extremity  Patient offers no additional complaints or concerns at this time  ROS:   General: No fever, no chills, no weight loss, no weight gain  HEENT:  No loss of hearing, no nose bleeds, no sore throat  Eyes:  No eye pain, no red eyes, no visual disturbance  Respiratory:  No cough, no shortness of breath, no wheezing  Cardiovascular:  No chest pain, no palpitations, no edema  GI: No abdominal pain, no nausea, no vomiting  Endocrine: No frequent urination, no excessive thirst  Urinary:  No dysuria, no hematuria, no incontinence  Musculoskeletal: see HPI and PE  Skin:  No rash, no wounds  Neurological:  No dizziness, no headache, no numbness  Psychiatric:  No difficulty concentrating, no depression, no suicide thoughts, no anxiety  Review of all other systems is negative    PMH:  Past Medical History:   Diagnosis Date    Disease of thyroid gland     Fractures     Rheumatoid arthritis (HCC)        PSH:  History reviewed  No pertinent surgical history      Medications:  Current Outpatient Medications   Medication Sig Dispense Refill    Calcium Amino Acid Chelate 30 % GRAN take one daily      Cholecalciferol (VITAMIN D3) 094868 UNIT/GM POWD Take by mouth Daily      Coconut Oil 1000 MG CAPS daily      Coenzyme Q10 (COQ-10) 10 MG CAPS daily      Flaxseed, Linseed, (FLAXSEED OIL) 1000 MG CAPS daily      Glucosamine-Chondroitin 4046-6990 MG/30ML LIQD Take by mouth      methocarbamol (ROBAXIN) 500 mg tablet Take 1 tablet (500 mg total) by mouth 2 (two) times a day 10 tablet 0    Milk Thistle 1000 MG CAPS daily       No current facility-administered medications for this visit  Allergies: Allergies   Allergen Reactions    Codeine GI Intolerance    Prednisone Other (See Comments)     Shaking and hallucinations       Family History:  Family History   Problem Relation Age of Onset    Cancer Mother     Arthritis Mother     Cancer Father     Kidney cancer Sister     Diabetes Sister     Heart attack Brother        Social History:  Social History     Occupational History    Not on file   Tobacco Use    Smoking status: Never Smoker    Smokeless tobacco: Never Used   Substance and Sexual Activity    Alcohol use: Yes     Alcohol/week: 7 0 standard drinks     Types: 7 Glasses of wine per week    Drug use: No    Sexual activity: Not on file       Physical Exam:  General :  Alert, cooperative, no distress, appears stated age  Blood pressure 139/89, pulse 66, temperature (!) 97 4 °F (36 3 °C), temperature source Temporal, resp  rate 20, height 5' 4" (1 626 m), weight 79 4 kg (175 lb)  Head:  Normocephalic, without obvious abnormality, atraumatic   Eyes:  Conjunctiva/corneas clear, EOM's intact,   Ears: Both ears normal appearance, no hearing deficits  Nose: Nares normal, septum midline, no drainage    · Neck: Supple,  trachea midline, no adenopathy, no tenderness, no mass   Back:   Symmetric, no curvature, ROM normal, no tenderness   Lungs:   Respirations unlabored   Chest Wall:  No tenderness or deformity   Extremities: Extremities normal, atraumatic, no cyanosis or edema      Pulses: 2+ and symmetric   Skin: Skin color, texture, turgor normal, no rashes or lesions      Neurologic: Normal             Ortho Exam  Left shoulder  · Skin intact without obvious deformity  · Minimal tenderness to palpation at fracture site   · Near full AROM with discomfort and end range flexion and abduction   · Strength deferred at this time        Imaging Studies: The following imaging studies were reviewed in office today   My findings are noted  Xrays taken 08/19/2020 of Left shoulder demonstrates minimal callus noted at distal clavicle fracture  Fracture line still visible  Alignment acceptable  AC joint   Articular surface intact  Assessment  Encounter Diagnosis   Name Primary? 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

## 2020-09-16 ENCOUNTER — OFFICE VISIT (OUTPATIENT)
Dept: OBGYN CLINIC | Facility: CLINIC | Age: 70
End: 2020-09-16
Payer: MEDICARE

## 2020-09-16 ENCOUNTER — APPOINTMENT (OUTPATIENT)
Dept: RADIOLOGY | Facility: CLINIC | Age: 70
End: 2020-09-16
Payer: MEDICARE

## 2020-09-16 VITALS
DIASTOLIC BLOOD PRESSURE: 79 MMHG | WEIGHT: 175 LBS | BODY MASS INDEX: 29.16 KG/M2 | SYSTOLIC BLOOD PRESSURE: 126 MMHG | HEART RATE: 67 BPM | HEIGHT: 65 IN

## 2020-09-16 DIAGNOSIS — S42.032D CLOSED DISPLACED FRACTURE OF ACROMIAL END OF LEFT CLAVICLE WITH ROUTINE HEALING, SUBSEQUENT ENCOUNTER: Primary | ICD-10-CM

## 2020-09-16 DIAGNOSIS — S42.032D CLOSED DISPLACED FRACTURE OF ACROMIAL END OF LEFT CLAVICLE WITH ROUTINE HEALING, SUBSEQUENT ENCOUNTER: ICD-10-CM

## 2020-09-16 PROCEDURE — 99213 OFFICE O/P EST LOW 20 MIN: CPT | Performed by: ORTHOPAEDIC SURGERY

## 2020-09-16 PROCEDURE — 73000 X-RAY EXAM OF COLLAR BONE: CPT

## 2020-09-16 NOTE — PROGRESS NOTES
SUBJECTIVE: Patient is a 78year old female 2 months and 2 weeks s/p Left clavicle fracture  Today, patient states she is continuing to work as a  and notices discomfort in Left shoulder occasionally  Reports years ago she sustained rotator cuff tear and now believe some pain in shoulder is coming from bother RTC tear and clavicle fracture  Patient uses daughters bone stimulator about 4 hours every day  She occasionally wears the sling while at work to prevent from lifting heavy objects/over working  Patient offers no additional complaints or concerns at this time  ROS:   General: No fever, no chills, no weight loss, no weight gain  HEENT:  No loss of hearing, no nose bleeds, no sore throat  Eyes:  No eye pain, no red eyes, no visual disturbance  Respiratory:  No cough, no shortness of breath, no wheezing  Cardiovascular:  No chest pain, no palpitations, no edema  GI: No abdominal pain, no nausea, no vomiting  Endocrine: No frequent urination, no excessive thirst  Urinary:  No dysuria, no hematuria, no incontinence  Musculoskeletal: see HPI and PE  Skin:  No rash, no wounds  Neurological:  No dizziness, no headache, no numbness  Psychiatric:  No difficulty concentrating, no depression, no suicide thoughts, no anxiety  Review of all other systems is negative    PMH:  Past Medical History:   Diagnosis Date    Disease of thyroid gland     Fractures     Rheumatoid arthritis (HCC)        PSH:  History reviewed  No pertinent surgical history      Medications:  Current Outpatient Medications   Medication Sig Dispense Refill    Calcium Amino Acid Chelate 30 % GRAN take one daily      Cholecalciferol (VITAMIN D3) 052558 UNIT/GM POWD Take by mouth Daily      Coconut Oil 1000 MG CAPS daily      Coenzyme Q10 (COQ-10) 10 MG CAPS daily      Flaxseed, Linseed, (FLAXSEED OIL) 1000 MG CAPS daily      Glucosamine-Chondroitin 9816-2160 MG/30ML LIQD Take by mouth      methocarbamol (ROBAXIN) 500 mg tablet Take 1 tablet (500 mg total) by mouth 2 (two) times a day 10 tablet 0    Milk Thistle 1000 MG CAPS daily       No current facility-administered medications for this visit  Allergies: Allergies   Allergen Reactions    Codeine GI Intolerance    Prednisone Other (See Comments)     Shaking and hallucinations       Family History:  Family History   Problem Relation Age of Onset    Cancer Mother     Arthritis Mother     Cancer Father     Kidney cancer Sister     Diabetes Sister     Heart attack Brother        Social History:  Social History     Occupational History    Not on file   Tobacco Use    Smoking status: Never Smoker    Smokeless tobacco: Never Used   Substance and Sexual Activity    Alcohol use: Yes     Alcohol/week: 7 0 standard drinks     Types: 7 Glasses of wine per week    Drug use: No    Sexual activity: Not on file       Physical Exam:  General :  Alert, cooperative, no distress, appears stated age  Blood pressure 126/79, pulse 67, height 5' 5" (1 651 m), weight 79 4 kg (175 lb)  Head:  Normocephalic, without obvious abnormality, atraumatic   Eyes:  Conjunctiva/corneas clear, EOM's intact,   Ears: Both ears normal appearance, no hearing deficits  Nose: Nares normal, septum midline, no drainage    Neck: Supple,  trachea midline, no adenopathy, no tenderness, no mass   Back:   Symmetric, no curvature, ROM normal, no tenderness   Lungs:   Respirations unlabored   Chest Wall:  No tenderness or deformity   Extremities: Extremities normal, atraumatic, no cyanosis or edema      Pulses: 2+ and symmetric   Skin: Skin color, texture, turgor normal, no rashes or lesions      Neurologic: Normal             Ortho Exam  Left shoulder   · Skin intact without evidence of skin tending, erythema, ecchymosis, deformity, rash  · No tenderness to palpation   · AROM 0-165 with some discomfort overhead   · IR to sacrum  · +2 radial pulse palpated       Imaging Studies:  The following imaging studies were reviewed in office today  My findings are noted  Xrays taken today 09/16/2020 of Left clavicle demonstrates distal clavicle fracture with minimal callus noted  Fracture line still visible  Assessment  Encounter Diagnosis   Name Primary?  Closed displaced fracture of acromial end of left clavicle with routine healing, subsequent encounter Yes           Plan: 79 yo female Left clavicle fracture   - Continue weightbearing restrictions, no lifting >10 lbs   - Can wear sling when at work or sleeping   No necessary to wear throughout day   - Continue use of bone stimulator  - Follow up in 2 months for reevaluation and new xrays

## 2020-11-03 ENCOUNTER — LAB (OUTPATIENT)
Dept: LAB | Facility: HOSPITAL | Age: 70
End: 2020-11-03
Payer: MEDICARE

## 2020-11-03 ENCOUNTER — TRANSCRIBE ORDERS (OUTPATIENT)
Dept: ADMINISTRATIVE | Facility: HOSPITAL | Age: 70
End: 2020-11-03

## 2020-11-03 DIAGNOSIS — M15.0 PRIMARY GENERALIZED HYPERTROPHIC OSTEOARTHROSIS: ICD-10-CM

## 2020-11-03 DIAGNOSIS — M15.0 PRIMARY GENERALIZED HYPERTROPHIC OSTEOARTHROSIS: Primary | ICD-10-CM

## 2020-11-03 DIAGNOSIS — E87.1 HYPOSMOLALITY SYNDROME: ICD-10-CM

## 2020-11-03 DIAGNOSIS — Z79.899 ENCOUNTER FOR LONG-TERM (CURRENT) USE OF OTHER MEDICATIONS: ICD-10-CM

## 2020-11-03 LAB
ALBUMIN SERPL BCP-MCNC: 3.8 G/DL (ref 3.5–5)
ALP SERPL-CCNC: 68 U/L (ref 46–116)
ALT SERPL W P-5'-P-CCNC: 38 U/L (ref 12–78)
ANION GAP SERPL CALCULATED.3IONS-SCNC: 9 MMOL/L (ref 4–13)
AST SERPL W P-5'-P-CCNC: 31 U/L (ref 5–45)
BASOPHILS # BLD AUTO: 0.06 THOUSANDS/ΜL (ref 0–0.1)
BASOPHILS NFR BLD AUTO: 1 % (ref 0–1)
BILIRUB SERPL-MCNC: 0.5 MG/DL (ref 0.2–1)
BUN SERPL-MCNC: 19 MG/DL (ref 5–25)
CALCIUM SERPL-MCNC: 8.9 MG/DL (ref 8.3–10.1)
CHLORIDE SERPL-SCNC: 95 MMOL/L (ref 100–108)
CO2 SERPL-SCNC: 26 MMOL/L (ref 21–32)
CREAT SERPL-MCNC: 0.56 MG/DL (ref 0.6–1.3)
EOSINOPHIL # BLD AUTO: 0.14 THOUSAND/ΜL (ref 0–0.61)
EOSINOPHIL NFR BLD AUTO: 3 % (ref 0–6)
ERYTHROCYTE [DISTWIDTH] IN BLOOD BY AUTOMATED COUNT: 13.1 % (ref 11.6–15.1)
ERYTHROCYTE [SEDIMENTATION RATE] IN BLOOD: 1 MM/HOUR (ref 0–29)
FERRITIN SERPL-MCNC: 80 NG/ML (ref 8–388)
FOLATE SERPL-MCNC: 19.3 NG/ML (ref 3.1–17.5)
GFR SERPL CREATININE-BSD FRML MDRD: 96 ML/MIN/1.73SQ M
GLUCOSE P FAST SERPL-MCNC: 94 MG/DL (ref 65–99)
HCT VFR BLD AUTO: 43.1 % (ref 34.8–46.1)
HGB BLD-MCNC: 14.5 G/DL (ref 11.5–15.4)
IMM GRANULOCYTES # BLD AUTO: 0.02 THOUSAND/UL (ref 0–0.2)
IMM GRANULOCYTES NFR BLD AUTO: 0 % (ref 0–2)
LYMPHOCYTES # BLD AUTO: 1.27 THOUSANDS/ΜL (ref 0.6–4.47)
LYMPHOCYTES NFR BLD AUTO: 27 % (ref 14–44)
MCH RBC QN AUTO: 34.9 PG (ref 26.8–34.3)
MCHC RBC AUTO-ENTMCNC: 33.6 G/DL (ref 31.4–37.4)
MCV RBC AUTO: 104 FL (ref 82–98)
MONOCYTES # BLD AUTO: 0.53 THOUSAND/ΜL (ref 0.17–1.22)
MONOCYTES NFR BLD AUTO: 11 % (ref 4–12)
NEUTROPHILS # BLD AUTO: 2.67 THOUSANDS/ΜL (ref 1.85–7.62)
NEUTS SEG NFR BLD AUTO: 58 % (ref 43–75)
NRBC BLD AUTO-RTO: 0 /100 WBCS
PLATELET # BLD AUTO: 180 THOUSANDS/UL (ref 149–390)
PMV BLD AUTO: 11 FL (ref 8.9–12.7)
POTASSIUM SERPL-SCNC: 5.2 MMOL/L (ref 3.5–5.3)
PROT SERPL-MCNC: 7.3 G/DL (ref 6.4–8.2)
RBC # BLD AUTO: 4.16 MILLION/UL (ref 3.81–5.12)
RETICS # AUTO: ABNORMAL 10*3/UL (ref 14097–95744)
RETICS # CALC: 2.36 % (ref 0.37–1.87)
SODIUM SERPL-SCNC: 130 MMOL/L (ref 136–145)
VIT B12 SERPL-MCNC: 745 PG/ML (ref 100–900)
WBC # BLD AUTO: 4.69 THOUSAND/UL (ref 4.31–10.16)

## 2020-11-03 PROCEDURE — 36415 COLL VENOUS BLD VENIPUNCTURE: CPT

## 2020-11-03 PROCEDURE — 82746 ASSAY OF FOLIC ACID SERUM: CPT

## 2020-11-03 PROCEDURE — 80053 COMPREHEN METABOLIC PANEL: CPT

## 2020-11-03 PROCEDURE — 82728 ASSAY OF FERRITIN: CPT

## 2020-11-03 PROCEDURE — 85652 RBC SED RATE AUTOMATED: CPT

## 2020-11-03 PROCEDURE — 82607 VITAMIN B-12: CPT

## 2020-11-03 PROCEDURE — 85045 AUTOMATED RETICULOCYTE COUNT: CPT

## 2020-11-03 PROCEDURE — 85025 COMPLETE CBC W/AUTO DIFF WBC: CPT

## 2020-11-13 ENCOUNTER — LAB (OUTPATIENT)
Dept: LAB | Facility: HOSPITAL | Age: 70
End: 2020-11-13
Payer: MEDICARE

## 2020-11-13 DIAGNOSIS — E87.1 HYPOSMOLALITY SYNDROME: ICD-10-CM

## 2020-11-13 DIAGNOSIS — M15.0 PRIMARY GENERALIZED HYPERTROPHIC OSTEOARTHROSIS: ICD-10-CM

## 2020-11-13 DIAGNOSIS — Z79.899 ENCOUNTER FOR LONG-TERM (CURRENT) USE OF OTHER MEDICATIONS: ICD-10-CM

## 2020-11-13 LAB
ANION GAP SERPL CALCULATED.3IONS-SCNC: 6 MMOL/L (ref 4–13)
BUN SERPL-MCNC: 17 MG/DL (ref 5–25)
CALCIUM SERPL-MCNC: 8.6 MG/DL (ref 8.3–10.1)
CHLORIDE SERPL-SCNC: 103 MMOL/L (ref 100–108)
CO2 SERPL-SCNC: 28 MMOL/L (ref 21–32)
CREAT SERPL-MCNC: 0.47 MG/DL (ref 0.6–1.3)
GFR SERPL CREATININE-BSD FRML MDRD: 102 ML/MIN/1.73SQ M
GLUCOSE P FAST SERPL-MCNC: 112 MG/DL (ref 65–99)
POTASSIUM SERPL-SCNC: 4 MMOL/L (ref 3.5–5.3)
SODIUM SERPL-SCNC: 137 MMOL/L (ref 136–145)

## 2020-11-13 PROCEDURE — 36415 COLL VENOUS BLD VENIPUNCTURE: CPT

## 2020-11-13 PROCEDURE — 80048 BASIC METABOLIC PNL TOTAL CA: CPT

## 2020-11-23 ENCOUNTER — LAB (OUTPATIENT)
Dept: LAB | Facility: HOSPITAL | Age: 70
End: 2020-11-23
Payer: MEDICARE

## 2020-11-23 DIAGNOSIS — E87.1 HYPOSMOLALITY SYNDROME: ICD-10-CM

## 2020-11-23 DIAGNOSIS — M15.0 PRIMARY GENERALIZED HYPERTROPHIC OSTEOARTHROSIS: ICD-10-CM

## 2020-11-23 DIAGNOSIS — Z79.899 ENCOUNTER FOR LONG-TERM (CURRENT) USE OF OTHER MEDICATIONS: ICD-10-CM

## 2020-11-23 LAB
25(OH)D3 SERPL-MCNC: 57.2 NG/ML (ref 30–100)
DAT POLY-SP REAG RBC QL: NEGATIVE
RETICS # AUTO: ABNORMAL 10*3/UL (ref 14097–95744)
RETICS # CALC: 2.06 % (ref 0.37–1.87)

## 2020-11-23 PROCEDURE — 83010 ASSAY OF HAPTOGLOBIN QUANT: CPT

## 2020-11-23 PROCEDURE — 82306 VITAMIN D 25 HYDROXY: CPT

## 2020-11-23 PROCEDURE — 85045 AUTOMATED RETICULOCYTE COUNT: CPT

## 2020-11-23 PROCEDURE — 86880 COOMBS TEST DIRECT: CPT

## 2020-11-23 PROCEDURE — 36415 COLL VENOUS BLD VENIPUNCTURE: CPT

## 2020-11-24 LAB — HAPTOGLOB SERPL-MCNC: 40 MG/DL (ref 37–355)

## 2020-12-02 ENCOUNTER — APPOINTMENT (OUTPATIENT)
Dept: RADIOLOGY | Facility: CLINIC | Age: 70
End: 2020-12-02
Payer: MEDICARE

## 2020-12-02 ENCOUNTER — OFFICE VISIT (OUTPATIENT)
Dept: OBGYN CLINIC | Facility: CLINIC | Age: 70
End: 2020-12-02
Payer: MEDICARE

## 2020-12-02 VITALS
BODY MASS INDEX: 29.16 KG/M2 | HEART RATE: 76 BPM | DIASTOLIC BLOOD PRESSURE: 78 MMHG | SYSTOLIC BLOOD PRESSURE: 133 MMHG | WEIGHT: 175 LBS | HEIGHT: 65 IN

## 2020-12-02 DIAGNOSIS — S42.032D CLOSED DISPLACED FRACTURE OF ACROMIAL END OF LEFT CLAVICLE WITH ROUTINE HEALING, SUBSEQUENT ENCOUNTER: Primary | ICD-10-CM

## 2020-12-02 DIAGNOSIS — S42.032D CLOSED DISPLACED FRACTURE OF ACROMIAL END OF LEFT CLAVICLE WITH ROUTINE HEALING, SUBSEQUENT ENCOUNTER: ICD-10-CM

## 2020-12-02 PROCEDURE — 73000 X-RAY EXAM OF COLLAR BONE: CPT

## 2020-12-02 PROCEDURE — 99213 OFFICE O/P EST LOW 20 MIN: CPT | Performed by: ORTHOPAEDIC SURGERY

## 2020-12-04 ENCOUNTER — TELEPHONE (OUTPATIENT)
Dept: HEMATOLOGY ONCOLOGY | Facility: CLINIC | Age: 70
End: 2020-12-04

## 2020-12-14 ENCOUNTER — TELEPHONE (OUTPATIENT)
Dept: HEMATOLOGY ONCOLOGY | Facility: CLINIC | Age: 70
End: 2020-12-14

## 2020-12-15 PROBLEM — D75.89 MACROCYTOSIS WITHOUT ANEMIA: Status: ACTIVE | Noted: 2020-12-15

## 2020-12-22 ENCOUNTER — TELEPHONE (OUTPATIENT)
Dept: HEMATOLOGY ONCOLOGY | Facility: CLINIC | Age: 70
End: 2020-12-22

## 2020-12-23 ENCOUNTER — CONSULT (OUTPATIENT)
Dept: HEMATOLOGY ONCOLOGY | Facility: CLINIC | Age: 70
End: 2020-12-23
Payer: MEDICARE

## 2020-12-23 VITALS
HEIGHT: 65 IN | WEIGHT: 180 LBS | TEMPERATURE: 98.7 F | DIASTOLIC BLOOD PRESSURE: 100 MMHG | BODY MASS INDEX: 29.99 KG/M2 | HEART RATE: 78 BPM | RESPIRATION RATE: 18 BRPM | SYSTOLIC BLOOD PRESSURE: 160 MMHG

## 2020-12-23 DIAGNOSIS — D75.89 MACROCYTOSIS WITHOUT ANEMIA: Primary | ICD-10-CM

## 2020-12-23 PROCEDURE — 99215 OFFICE O/P EST HI 40 MIN: CPT | Performed by: INTERNAL MEDICINE

## 2021-03-17 ENCOUNTER — IMMUNIZATIONS (OUTPATIENT)
Dept: FAMILY MEDICINE CLINIC | Facility: HOSPITAL | Age: 71
End: 2021-03-17

## 2021-03-17 DIAGNOSIS — Z23 ENCOUNTER FOR IMMUNIZATION: Primary | ICD-10-CM

## 2021-03-17 PROCEDURE — 91300 SARS-COV-2 / COVID-19 MRNA VACCINE (PFIZER-BIONTECH) 30 MCG: CPT

## 2021-03-17 PROCEDURE — 0001A SARS-COV-2 / COVID-19 MRNA VACCINE (PFIZER-BIONTECH) 30 MCG: CPT

## 2021-03-20 ENCOUNTER — APPOINTMENT (OUTPATIENT)
Dept: LAB | Facility: HOSPITAL | Age: 71
End: 2021-03-20
Payer: COMMERCIAL

## 2021-03-20 DIAGNOSIS — D75.89 MACROCYTOSIS WITHOUT ANEMIA: ICD-10-CM

## 2021-03-20 LAB
BASOPHILS # BLD AUTO: 0.03 THOUSANDS/ΜL (ref 0–0.1)
BASOPHILS NFR BLD AUTO: 1 % (ref 0–1)
EOSINOPHIL # BLD AUTO: 0.09 THOUSAND/ΜL (ref 0–0.61)
EOSINOPHIL NFR BLD AUTO: 2 % (ref 0–6)
ERYTHROCYTE [DISTWIDTH] IN BLOOD BY AUTOMATED COUNT: 12.2 % (ref 11.6–15.1)
HCT VFR BLD AUTO: 40.2 % (ref 34.8–46.1)
HGB BLD-MCNC: 13.6 G/DL (ref 11.5–15.4)
IMM GRANULOCYTES # BLD AUTO: 0.01 THOUSAND/UL (ref 0–0.2)
IMM GRANULOCYTES NFR BLD AUTO: 0 % (ref 0–2)
LYMPHOCYTES # BLD AUTO: 1.03 THOUSANDS/ΜL (ref 0.6–4.47)
LYMPHOCYTES NFR BLD AUTO: 28 % (ref 14–44)
MCH RBC QN AUTO: 34.8 PG (ref 26.8–34.3)
MCHC RBC AUTO-ENTMCNC: 33.8 G/DL (ref 31.4–37.4)
MCV RBC AUTO: 103 FL (ref 82–98)
MONOCYTES # BLD AUTO: 0.37 THOUSAND/ΜL (ref 0.17–1.22)
MONOCYTES NFR BLD AUTO: 10 % (ref 4–12)
NEUTROPHILS # BLD AUTO: 2.16 THOUSANDS/ΜL (ref 1.85–7.62)
NEUTS SEG NFR BLD AUTO: 59 % (ref 43–75)
NRBC BLD AUTO-RTO: 0 /100 WBCS
PLATELET # BLD AUTO: 173 THOUSANDS/UL (ref 149–390)
PMV BLD AUTO: 11.5 FL (ref 8.9–12.7)
RBC # BLD AUTO: 3.91 MILLION/UL (ref 3.81–5.12)
WBC # BLD AUTO: 3.69 THOUSAND/UL (ref 4.31–10.16)

## 2021-03-20 PROCEDURE — 85025 COMPLETE CBC W/AUTO DIFF WBC: CPT

## 2021-03-20 PROCEDURE — 36415 COLL VENOUS BLD VENIPUNCTURE: CPT

## 2021-03-24 ENCOUNTER — OFFICE VISIT (OUTPATIENT)
Dept: HEMATOLOGY ONCOLOGY | Facility: CLINIC | Age: 71
End: 2021-03-24
Payer: COMMERCIAL

## 2021-03-24 VITALS
BODY MASS INDEX: 29.16 KG/M2 | RESPIRATION RATE: 18 BRPM | SYSTOLIC BLOOD PRESSURE: 132 MMHG | OXYGEN SATURATION: 97 % | WEIGHT: 175 LBS | HEART RATE: 70 BPM | HEIGHT: 65 IN | TEMPERATURE: 98 F | DIASTOLIC BLOOD PRESSURE: 84 MMHG

## 2021-03-24 DIAGNOSIS — D75.89 MACROCYTOSIS WITHOUT ANEMIA: Primary | ICD-10-CM

## 2021-03-24 PROCEDURE — 1160F RVW MEDS BY RX/DR IN RCRD: CPT | Performed by: INTERNAL MEDICINE

## 2021-03-24 PROCEDURE — 3008F BODY MASS INDEX DOCD: CPT | Performed by: INTERNAL MEDICINE

## 2021-03-24 PROCEDURE — 99214 OFFICE O/P EST MOD 30 MIN: CPT | Performed by: INTERNAL MEDICINE

## 2021-03-24 NOTE — PROGRESS NOTES
[facility] - Hematology & Medical Oncology  Outpatient Visit Encounter Note      Marilyn Mendoza May 71 y o  female [id number] Date:  3/24/2021      SUBJECTIVE      INITIAL CONSULT    Marilyn Mendoza is a 70F referred from Dr Osman Pacheco  I am seeing her today for the first time as a consult for abnormal retic count  3/28/2017 09:33 11/3/2020 10:47 11/23/2020 11:31   WBC 8 16 4 69    Red Blood Cell Count 4 21 4 16    Hemoglobin 14 8 14 5    HCT 41 9 43 1     (H) 104 (H)    MCH 35 2 (H) 34 9 (H)    MCHC 35 3 33 6    RDW 12 5 13 1    Platelet Count 179 104    MPV 10 4 11 0    nRBC 0 0    Neutrophils % 83 (H) 58    Immat GRANS %  0    Lymphocytes Relative 8 (L) 27    Monocytes Relative 9 11    Eosinophils 0 3    Basophils Relative 0 1    Immature Grans Absolute  0 02    Absolute Neutrophils 6 79 2 67    Lymphocytes Absolute 0 61 1 27    Absolute Monocytes 0 70 0 53    Absolute Eosinophils 0 00 0 14    Basophils Absolute 0 02 0 06    Retic Ct Pct  2 36 (H) 2 06 (H)   Retic Ct Abs  98,200 (H) 82,000     As seen above, she has macrocytic RBC with normal Hb and normal WBC and platelet count  Her Retic count has been mildly elevated  She is here by herself  She offer no acute complaints  She denies any cancer history  Her father had prostate cancer and her mother had lymphoma  She continues to work as a hairdresser  She denies unusual weight loss, night sweats, f/c/n/v/cp/ap/sob  She denies any CBC issues  She denies any unusual bruising or bleeding  THIS VISIT    She has no acute complaints today  There is something different since she saw me last time  She says that she is living her routine life very active around the house and around her work  Denies any head to toe complaints for me today  I have reviewed the relevant past medical, surgical, social and family history  I have also reviewed allergies and medications for this patient      Review of Systems  Review of Systems   All other systems reviewed and are negative  OBJECTIVE     Physical Exam  Vitals:    03/24/21 1122   BP: 132/84   BP Location: Left arm   Patient Position: Sitting   Pulse: 70   Resp: 18   Temp: 98 °F (36 7 °C)   TempSrc: Tympanic   SpO2: 97%   Weight: 79 4 kg (175 lb)   Height: 5' 5" (1 651 m)       Physical Exam  Constitutional:       General: She is not in acute distress  HENT:      Head: Normocephalic and atraumatic  Eyes:      General: No scleral icterus  Extraocular Movements: Extraocular movements intact  Neck:      Musculoskeletal: Normal range of motion  Cardiovascular:      Rate and Rhythm: Normal rate  Pulmonary:      Effort: Pulmonary effort is normal    Musculoskeletal: Normal range of motion  Skin:     General: Skin is dry  Neurological:      General: No focal deficit present  Mental Status: She is alert and oriented to person, place, and time  Gait: Gait normal           Imaging  Relevant imaging reviewed in chart    Labs  Relevant labs reviewed in chart   ASSESSMENT & PLAN      Diagnosis ICD-10-CM Associated Orders   1  Macrocytosis without anemia  D75 89      Portions of what is written below is from last visit and holds validity this visit as well    · There is no clear explanation for the borderline macrocytic without anemia count for Alissa's blood work  This was there in 2017  · Her retic count values are also of no clinical value given that no hemolysis or marrow suppression is present in the CBC to help make sense of them  · I recommend monitoring with repeat CBC with diff in 6 months    Follow Up   6 months      All questions were answered to the patient's satisfaction during this encounter  They appreciated and thanked me for spending time with them  The patient knows the contact information for our office and know to reach out for any relevant concerns related to this encounter   For all other listed problems and medical diagnosis in his chart - they are managed by PCP and/or other specialists, which patient acknowledges  Dr Conrad Casey MD  Hematology & Medical Oncology

## 2021-04-09 ENCOUNTER — IMMUNIZATIONS (OUTPATIENT)
Dept: FAMILY MEDICINE CLINIC | Facility: HOSPITAL | Age: 71
End: 2021-04-09

## 2021-04-09 DIAGNOSIS — Z23 ENCOUNTER FOR IMMUNIZATION: Primary | ICD-10-CM

## 2021-04-09 PROCEDURE — 0002A SARS-COV-2 / COVID-19 MRNA VACCINE (PFIZER-BIONTECH) 30 MCG: CPT

## 2021-04-09 PROCEDURE — 91300 SARS-COV-2 / COVID-19 MRNA VACCINE (PFIZER-BIONTECH) 30 MCG: CPT

## 2021-09-08 ENCOUNTER — OFFICE VISIT (OUTPATIENT)
Dept: INTERNAL MEDICINE CLINIC | Facility: CLINIC | Age: 71
End: 2021-09-08
Payer: COMMERCIAL

## 2021-09-08 VITALS
WEIGHT: 163 LBS | BODY MASS INDEX: 27.16 KG/M2 | HEIGHT: 65 IN | OXYGEN SATURATION: 99 % | DIASTOLIC BLOOD PRESSURE: 82 MMHG | HEART RATE: 72 BPM | SYSTOLIC BLOOD PRESSURE: 140 MMHG | TEMPERATURE: 98.9 F

## 2021-09-08 DIAGNOSIS — D75.89 MACROCYTOSIS WITHOUT ANEMIA: ICD-10-CM

## 2021-09-08 DIAGNOSIS — Z12.12 SCREENING FOR COLORECTAL CANCER: ICD-10-CM

## 2021-09-08 DIAGNOSIS — Z76.89 ENCOUNTER TO ESTABLISH CARE: Primary | ICD-10-CM

## 2021-09-08 DIAGNOSIS — Z12.11 SCREENING FOR COLORECTAL CANCER: ICD-10-CM

## 2021-09-08 DIAGNOSIS — Z00.00 ENCOUNTER FOR SUBSEQUENT ANNUAL WELLNESS VISIT (AWV) IN MEDICARE PATIENT: ICD-10-CM

## 2021-09-08 PROCEDURE — 1101F PT FALLS ASSESS-DOCD LE1/YR: CPT | Performed by: FAMILY MEDICINE

## 2021-09-08 PROCEDURE — 1160F RVW MEDS BY RX/DR IN RCRD: CPT | Performed by: FAMILY MEDICINE

## 2021-09-08 PROCEDURE — 3288F FALL RISK ASSESSMENT DOCD: CPT | Performed by: FAMILY MEDICINE

## 2021-09-08 PROCEDURE — 3008F BODY MASS INDEX DOCD: CPT | Performed by: FAMILY MEDICINE

## 2021-09-08 PROCEDURE — G0439 PPPS, SUBSEQ VISIT: HCPCS | Performed by: FAMILY MEDICINE

## 2021-09-08 PROCEDURE — 1125F AMNT PAIN NOTED PAIN PRSNT: CPT | Performed by: FAMILY MEDICINE

## 2021-09-08 PROCEDURE — 3725F SCREEN DEPRESSION PERFORMED: CPT | Performed by: FAMILY MEDICINE

## 2021-09-08 PROCEDURE — 99204 OFFICE O/P NEW MOD 45 MIN: CPT | Performed by: FAMILY MEDICINE

## 2021-09-08 PROCEDURE — 1170F FXNL STATUS ASSESSED: CPT | Performed by: FAMILY MEDICINE

## 2021-09-08 PROCEDURE — 1036F TOBACCO NON-USER: CPT | Performed by: FAMILY MEDICINE

## 2021-09-08 NOTE — PROGRESS NOTES
Assessment and Plan:     Problem List Items Addressed This Visit        Other    Macrocytosis without anemia      Other Visit Diagnoses     Encounter to establish care    -  Primary    Relevant Orders    Comprehensive metabolic panel    Lipid Panel with Direct LDL reflex    Screening for colorectal cancer        Relevant Orders    Cologuard    Encounter for subsequent annual wellness visit (AWV) in Medicare patient               Preventive health issues were discussed with patient, and age appropriate screening tests were ordered as noted in patient's After Visit Summary  Personalized health advice and appropriate referrals for health education or preventive services given if needed, as noted in patient's After Visit Summary  History of Present Illness:     Patient presents for Medicare Annual Wellness visit    Patient Care Team:  DO christi Howell PCP - General (Family Medicine)     Problem List:     Patient Active Problem List   Diagnosis    Macrocytosis without anemia      Past Medical and Surgical History:     Past Medical History:   Diagnosis Date    Disease of thyroid gland     Fractures     Rheumatoid arthritis ([facility] [address] )      History reviewed  No pertinent surgical history  Family History:     Family History   Problem Relation Age of Onset    Cancer Mother     Arthritis Mother     Cancer Father     Kidney cancer Sister     Diabetes Sister     Heart attack Brother       Social History:     Social History     Socioeconomic History    Marital status:      Spouse name: None    Number of children: None    Years of education: None    Highest education level: None   Occupational History    None   Tobacco Use    Smoking status: Never Smoker    Smokeless tobacco: Never Used   Vaping Use    Vaping Use: Never used   Substance and Sexual Activity    Alcohol use:  Yes     Alcohol/week: 7 0 standard drinks     Types: 7 Glasses of wine per week    Drug use: No    Sexual activity: None Other Topics Concern    None   Social History Narrative    None     Social Determinants of Health     Financial Resource Strain:     Difficulty of Paying Living Expenses:    Food Insecurity:     Worried About Running Out of Food in the Last Year:     [address] in the Last Year:    Transportation Needs:     Lack of Transportation (Medical):  Lack of Transportation (Non-Medical):    Physical Activity:     Days of Exercise per Week:     Minutes of Exercise per Session:    Stress:     Feeling of Stress :    Social Connections:     Frequency of Communication with Friends and Family:     Frequency of Social Gatherings with Friends and Family:     Attends Faith Services:     Active Member of Clubs or Organizations:     Attends Club or Organization Meetings:     Marital Status:    Intimate Partner Violence:     Fear of Current or Ex-Partner:     Emotionally Abused:     Physically Abused:     Sexually Abused:       Medications and Allergies:     Current Outpatient Medications   Medication Sig Dispense Refill    Cholecalciferol (VITAMIN D3) 044562 UNIT/GM POWD Take by mouth Daily      Coconut Oil 1000 MG CAPS daily      Coenzyme Q10 (COQ-10) 10 MG CAPS daily      Flaxseed, Linseed, (FLAXSEED OIL) 1000 MG CAPS daily      Glucosamine-Chondroitin 7231-8739 MG/30ML LIQD Take by mouth      Milk Thistle 1000 MG CAPS daily       No current facility-administered medications for this visit       Allergies   Allergen Reactions    Codeine GI Intolerance    Prednisone Other (See Comments)     Shaking and hallucinations      Immunizations:     Immunization History   Administered Date(s) Administered    SARS-CoV-2 / COVID-19 mRNA IM (Pfizer-BioNTech) 03/17/2021, 04/09/2021    Tdap 07/04/2020      Health Maintenance:         Topic Date Due    Hepatitis C Screening  Never done    Breast Cancer Screening: Mammogram  Never done    Colorectal Cancer Screening  Never done         Topic Date Due    Pneumococcal Vaccine: 65+ Years (1 of 1 - PPSV23) Never done    Influenza Vaccine (1) 09/01/2021      Medicare Health Risk Assessment:     /82 (BP Location: Left arm, Patient Position: Sitting)   Pulse 72   Temp 98 9 °F (37 2 °C) (Tympanic)   Ht 5' 5" (1 651 m)   Wt 73 9 kg (163 lb)   SpO2 99%   BMI 27 12 kg/m²      Dominic Riggins is here for her Subsequent Wellness visit  Health Risk Assessment:   Patient rates overall health as fair  Patient feels that their physical health rating is same  Patient is satisfied with their life  Eyesight was rated as same  Hearing was rated as same  Patient feels that their emotional and mental health rating is same  Patients states they are never, rarely angry  Patient states they are never, rarely unusually tired/fatigued  Pain experienced in the last 7 days has been none  Depression Screening:   PHQ-2 Score: 0      Fall Risk Screening: In the past year, patient has experienced: no history of falling in past year      Home Safety:  Patient does not have trouble with stairs inside or outside of their home  Patient has working smoke alarms and has working carbon monoxide detector  Nutrition:   Current diet is Regular  Medications:   Patient is currently taking over-the-counter supplements  OTC medications include: see medication list  Patient is able to manage medications  Activities of Daily Living (ADLs)/Instrumental Activities of Daily Living (IADLs):   Walk and transfer into and out of bed and chair?: Yes  Dress and groom yourself?: Yes    Bathe or shower yourself?: Yes    Feed yourself?  Yes  Do your laundry/housekeeping?: Yes  Manage your money, pay your bills and track your expenses?: Yes  Make your own meals?: Yes    Do your own shopping?: Yes    Previous Hospitalizations:   Any hospitalizations or ED visits within the last 12 months?: No      Advance Care Planning:   Living will: No    Advanced directive: No    End of Life Decisions reviewed with patient: Yes      PREVENTIVE SCREENINGS      Cardiovascular Screening:    General: Risks and Benefits Discussed    Due for: Lipid Panel      Diabetes Screening:     General: Screening Current      Colorectal Cancer Screening:     General: Risks and Benefits Discussed    Due for: Cologuard      Breast Cancer Screening:     General: Risks and Benefits Discussed      Cervical Cancer Screening:    General: Screening Not Indicated      Lung Cancer Screening:     General: Screening Not Indicated      Hepatitis C Screening:    General: Risks and Benefits Discussed and Patient Declines    Screening, Brief Intervention, and Referral to Treatment (SBIRT)    Screening  Typical number of drinks in a day: 0  Typical number of drinks in a week: 0  Interpretation: Low risk drinking behavior        Sandeep Bañuelos DO

## 2021-09-08 NOTE — PROGRESS NOTES
FOLLOW-UP OFFICE VISIT  [facility] - [facility] OF [address] [place] [address]    NAME: Jay Bryant  AGE: 73 y o  SEX: female  : 1950     DATE: 2021     Assessment and Plan:     Problem List Items Addressed This Visit        Other    Macrocytosis without anemia      Other Visit Diagnoses     Encounter to establish care    -  Primary    Relevant Orders    Comprehensive metabolic panel    Lipid Panel with Direct LDL reflex    Screening for colorectal cancer        Relevant Orders    Cologuard        Well appearing 73year old woman  Never had colon cancer screening  Low risk  cologuard ordered  Pt to continue to follow with heme-onc  Additional screening labs provided  BMI Counseling: Body mass index is 27 12 kg/m²  The BMI is above normal  Nutrition recommendations include consuming healthier snacks, limiting drinks that contain sugar and moderation in carbohydrate intake  Return in about 9 months (around 2022) for Annual physical      Chief Complaint:     Chief Complaint   Patient presents with    Establish Care        History of Present Illness:     Presents to establish care  Reports hx of arthritis and macrocytosis without anemia  No other chronic illness  Exercises often  Still works as a   Does have a family hx of hld and HTN  No personal hx of MI or CVA  Denies family hx of breast cancer or colon cancer  LAST MAMMO- less than a year ago  Last pap- less than a year ago  Colon cancer screening- never         Review of Systems:     Review of Systems   Constitutional: Negative for fever  Eyes: Negative for visual disturbance (wears glasses)  Respiratory: Negative for chest tightness and shortness of breath  Cardiovascular: Negative for chest pain  Gastrointestinal: Negative for constipation, diarrhea and nausea  Genitourinary: Negative for difficulty urinating, frequency and urgency     Musculoskeletal: Positive for arthralgias and back pain  Neurological: Negative for headaches  Problem List:     Patient Active Problem List   Diagnosis    Macrocytosis without anemia        Objective:     /82 (BP Location: Left arm, Patient Position: Sitting)   Pulse 72   Temp 98 9 °F (37 2 °C) (Tympanic)   Ht 5' 5" (1 651 m)   Wt 73 9 kg (163 lb)   SpO2 99%   BMI 27 12 kg/m²     Physical Exam  Constitutional:       Appearance: She is not ill-appearing  HENT:      Head: Normocephalic and atraumatic  Right Ear: Tympanic membrane and external ear normal       Left Ear: Tympanic membrane and external ear normal       Mouth/Throat:      Mouth: Mucous membranes are moist       Pharynx: Oropharynx is clear  Eyes:      Conjunctiva/sclera: Conjunctivae normal       Pupils: Pupils are equal, round, and reactive to light  Cardiovascular:      Rate and Rhythm: Normal rate and regular rhythm  Heart sounds: No murmur heard  Pulmonary:      Effort: Pulmonary effort is normal       Breath sounds: Normal breath sounds  Abdominal:      General: Abdomen is flat  Bowel sounds are normal       Palpations: Abdomen is soft  Musculoskeletal:         General: Deformity (arthitic changes of the dips, pips, and knees b/l ) present  Neurological:      Mental Status: She is alert and oriented to person, place, and time        Gait: Gait normal    Psychiatric:         Mood and Affect: Mood normal          Behavior: Behavior normal          Pertinent Laboratory/Diagnostic Studies:    Laboratory Results: I have personally reviewed the pertinent laboratory results/reports     CBC:   Results from Last 12 Months   Lab Units 03/20/21  1150   WBC Thousand/uL 3 69*   RBC Million/uL 3 91   HEMOGLOBIN g/dL 13 6   HEMATOCRIT % 40 2   MCV fL 103*   MCH pg 34 8*   MCHC g/dL 33 8   RDW % 12 2   MPV fL 11 5   PLATELETS Thousands/uL 173   NRBC AUTO /100 WBCs 0   NEUTROS PCT % 59   LYMPHS PCT % 28   MONOS PCT % 10   EOS PCT % 2   BASOS PCT % 1   NEUTROS ABS Thousands/µL 2 16   LYMPHS ABS Thousands/µL 1 03   MONOS ABS Thousand/µL 0 37   EOS ABS Thousand/µL 0 09           Thuyreena ROWE: [facility]  9/8/2021 3:42 PM

## 2021-09-11 ENCOUNTER — APPOINTMENT (OUTPATIENT)
Dept: LAB | Facility: HOSPITAL | Age: 71
End: 2021-09-11
Payer: COMMERCIAL

## 2021-09-11 DIAGNOSIS — D75.89 MACROCYTOSIS WITHOUT ANEMIA: ICD-10-CM

## 2021-09-11 DIAGNOSIS — Z76.89 ENCOUNTER TO ESTABLISH CARE: ICD-10-CM

## 2021-09-11 LAB
ALBUMIN SERPL BCP-MCNC: 3.9 G/DL (ref 3.5–5)
ALP SERPL-CCNC: 61 U/L (ref 46–116)
ALT SERPL W P-5'-P-CCNC: 47 U/L (ref 12–78)
ANION GAP SERPL CALCULATED.3IONS-SCNC: 8 MMOL/L (ref 4–13)
AST SERPL W P-5'-P-CCNC: 39 U/L (ref 5–45)
BASOPHILS # BLD AUTO: 0.06 THOUSANDS/ΜL (ref 0–0.1)
BASOPHILS NFR BLD AUTO: 1 % (ref 0–1)
BILIRUB SERPL-MCNC: 0.63 MG/DL (ref 0.2–1)
BUN SERPL-MCNC: 14 MG/DL (ref 5–25)
CALCIUM SERPL-MCNC: 8.6 MG/DL (ref 8.3–10.1)
CHLORIDE SERPL-SCNC: 99 MMOL/L (ref 100–108)
CHOLEST SERPL-MCNC: 229 MG/DL (ref 50–200)
CO2 SERPL-SCNC: 27 MMOL/L (ref 21–32)
CREAT SERPL-MCNC: 0.52 MG/DL (ref 0.6–1.3)
EOSINOPHIL # BLD AUTO: 0.17 THOUSAND/ΜL (ref 0–0.61)
EOSINOPHIL NFR BLD AUTO: 4 % (ref 0–6)
ERYTHROCYTE [DISTWIDTH] IN BLOOD BY AUTOMATED COUNT: 13.5 % (ref 11.6–15.1)
GFR SERPL CREATININE-BSD FRML MDRD: 98 ML/MIN/1.73SQ M
GLUCOSE P FAST SERPL-MCNC: 87 MG/DL (ref 65–99)
HCT VFR BLD AUTO: 38.7 % (ref 34.8–46.1)
HDLC SERPL-MCNC: 110 MG/DL
HGB BLD-MCNC: 13.6 G/DL (ref 11.5–15.4)
IMM GRANULOCYTES # BLD AUTO: 0.02 THOUSAND/UL (ref 0–0.2)
IMM GRANULOCYTES NFR BLD AUTO: 1 % (ref 0–2)
LDLC SERPL CALC-MCNC: 107 MG/DL (ref 0–100)
LYMPHOCYTES # BLD AUTO: 1.19 THOUSANDS/ΜL (ref 0.6–4.47)
LYMPHOCYTES NFR BLD AUTO: 27 % (ref 14–44)
MCH RBC QN AUTO: 35.6 PG (ref 26.8–34.3)
MCHC RBC AUTO-ENTMCNC: 35.1 G/DL (ref 31.4–37.4)
MCV RBC AUTO: 101 FL (ref 82–98)
MONOCYTES # BLD AUTO: 0.5 THOUSAND/ΜL (ref 0.17–1.22)
MONOCYTES NFR BLD AUTO: 11 % (ref 4–12)
NEUTROPHILS # BLD AUTO: 2.44 THOUSANDS/ΜL (ref 1.85–7.62)
NEUTS SEG NFR BLD AUTO: 56 % (ref 43–75)
NRBC BLD AUTO-RTO: 0 /100 WBCS
PLATELET # BLD AUTO: 186 THOUSANDS/UL (ref 149–390)
PMV BLD AUTO: 11.7 FL (ref 8.9–12.7)
POTASSIUM SERPL-SCNC: 4.4 MMOL/L (ref 3.5–5.3)
PROT SERPL-MCNC: 7.2 G/DL (ref 6.4–8.2)
RBC # BLD AUTO: 3.82 MILLION/UL (ref 3.81–5.12)
SODIUM SERPL-SCNC: 134 MMOL/L (ref 136–145)
TRIGL SERPL-MCNC: 62 MG/DL
WBC # BLD AUTO: 4.38 THOUSAND/UL (ref 4.31–10.16)

## 2021-09-11 PROCEDURE — 80053 COMPREHEN METABOLIC PANEL: CPT

## 2021-09-11 PROCEDURE — 85025 COMPLETE CBC W/AUTO DIFF WBC: CPT

## 2021-09-11 PROCEDURE — 36415 COLL VENOUS BLD VENIPUNCTURE: CPT

## 2021-09-11 PROCEDURE — 80061 LIPID PANEL: CPT

## 2021-09-14 ENCOUNTER — TELEPHONE (OUTPATIENT)
Dept: HEMATOLOGY ONCOLOGY | Facility: CLINIC | Age: 71
End: 2021-09-14

## 2021-09-14 NOTE — TELEPHONE ENCOUNTER
Spoke to patient to reschedule her appt she had with Dr Brad Machado 9/15/21    The new date is 10/12/21 at 11:40am   Patient agreed

## 2021-09-15 ENCOUNTER — TELEPHONE (OUTPATIENT)
Dept: HEMATOLOGY ONCOLOGY | Facility: CLINIC | Age: 71
End: 2021-09-15

## 2021-09-22 ENCOUNTER — TELEPHONE (OUTPATIENT)
Dept: INTERNAL MEDICINE CLINIC | Facility: CLINIC | Age: 71
End: 2021-09-22

## 2021-09-22 NOTE — TELEPHONE ENCOUNTER
----- Message from Nguyen Savage DO sent at 9/22/2021  3:48 PM EDT -----  Please notify pt that blood work is ok  Her cholesterol was moderately high  She should work on cutting back oil, dairy and red meats to help with this  We can recheck in 3-6 months to make sure her numbers have improved

## 2021-10-12 PROBLEM — I87.2 VENOUS INSUFFICIENCY: Status: ACTIVE | Noted: 2021-10-12

## 2021-10-12 PROBLEM — M79.7 FIBROMYALGIA: Status: ACTIVE | Noted: 2021-10-12

## 2021-10-12 PROBLEM — M15.0 PRIMARY GENERALIZED (OSTEO)ARTHRITIS: Status: ACTIVE | Noted: 2021-10-12

## 2021-10-12 PROBLEM — M72.2 PLANTAR FASCIITIS: Status: ACTIVE | Noted: 2021-10-12

## 2021-10-12 PROBLEM — M47.816 LUMBAR SPONDYLOSIS: Status: ACTIVE | Noted: 2021-10-12

## 2021-10-12 PROBLEM — M17.0 PRIMARY OSTEOARTHRITIS OF BOTH KNEES: Status: ACTIVE | Noted: 2021-10-12

## 2021-10-12 LAB — COLOGUARD RESULT REPORTABLE: NEGATIVE

## 2021-10-13 ENCOUNTER — TELEPHONE (OUTPATIENT)
Dept: INTERNAL MEDICINE CLINIC | Facility: CLINIC | Age: 71
End: 2021-10-13

## 2021-10-20 ENCOUNTER — OFFICE VISIT (OUTPATIENT)
Dept: HEMATOLOGY ONCOLOGY | Facility: CLINIC | Age: 71
End: 2021-10-20
Payer: COMMERCIAL

## 2021-10-20 VITALS
WEIGHT: 162 LBS | TEMPERATURE: 97.1 F | BODY MASS INDEX: 28.7 KG/M2 | DIASTOLIC BLOOD PRESSURE: 70 MMHG | RESPIRATION RATE: 16 BRPM | HEIGHT: 63 IN | HEART RATE: 70 BPM | SYSTOLIC BLOOD PRESSURE: 138 MMHG | OXYGEN SATURATION: 98 %

## 2021-10-20 DIAGNOSIS — D75.89 MACROCYTOSIS WITHOUT ANEMIA: Primary | ICD-10-CM

## 2021-10-20 PROCEDURE — 1160F RVW MEDS BY RX/DR IN RCRD: CPT | Performed by: INTERNAL MEDICINE

## 2021-10-20 PROCEDURE — 1036F TOBACCO NON-USER: CPT | Performed by: INTERNAL MEDICINE

## 2021-10-20 PROCEDURE — 99214 OFFICE O/P EST MOD 30 MIN: CPT | Performed by: INTERNAL MEDICINE

## 2022-05-02 ENCOUNTER — CONSULT (OUTPATIENT)
Dept: VASCULAR SURGERY | Facility: CLINIC | Age: 72
End: 2022-05-02
Payer: COMMERCIAL

## 2022-05-02 VITALS
HEIGHT: 63 IN | WEIGHT: 153 LBS | HEART RATE: 72 BPM | TEMPERATURE: 98.2 F | SYSTOLIC BLOOD PRESSURE: 124 MMHG | BODY MASS INDEX: 27.11 KG/M2 | DIASTOLIC BLOOD PRESSURE: 64 MMHG

## 2022-05-02 DIAGNOSIS — I87.2 VENOUS INSUFFICIENCY: Primary | ICD-10-CM

## 2022-05-02 PROCEDURE — 99203 OFFICE O/P NEW LOW 30 MIN: CPT | Performed by: NURSE PRACTITIONER

## 2022-05-02 PROCEDURE — 3008F BODY MASS INDEX DOCD: CPT | Performed by: NURSE PRACTITIONER

## 2022-05-02 PROCEDURE — 1036F TOBACCO NON-USER: CPT | Performed by: NURSE PRACTITIONER

## 2022-05-02 NOTE — PROGRESS NOTES
Assessment/Plan:    Venous insufficiency  74-year-old female with fibromyalgia, osteoarthritis, macrocytosis without anemia, and venous insufficiency presents with complaints of LLE dilated veins and pain to left medial/posterior ankle  - c/o L medial/posterior ankle pain/tenderness  - BLE telangiectasia L>R (significant left ankle veins)  - tender to touch - SVT?  - patient wears daily compression  - no edema  - patient is not concerned with cosmetic aesthetic look of legs  - bilateral palpable DP pulse    Plan:   - continue use of medical grade compression stockings 20-30 mmHg  On a m , off in p m    - frequent ambulation, leg elevation at rest  - trial warm compress and NSAIDs for left ankle tenderness ? SVT  - left LEVDR- will call with results and plans for follow-up         Diagnoses and all orders for this visit:    Venous insufficiency  -     VAS reflux lower limb venous duplex study with reflux assesment, unilateral; Future          Subjective:      Patient ID: Nathan Valadez May is a 81 y o  female  Patient presents today for evaluation of Veins  Patient reports pain and tenderness to legs bilaterally, L>R  Wearing compression regularly  See assessment and plan  Patient is a 74-year-old female who presents with complaints of lower extremity dilated reticular and discolored veins L>R  Left medial/posterior ankle tender to touch  Patient with known venous insufficiency by report who works as  and stands for long periods of time  She endorses daily use of medical grade compression stockings  Patient is not concerned with look of veins but instead tenderness to left posterior ankle  There is no edema  Discussed pathophysiology of venous insufficiency and varicose veins  We will obtain left LEVDR and call patient with results and recommendations for follow-up    In the meantime encouraged continued medical management with daily use of medical grade compression stockings 20-30 mmHg, frequent ambulation leg elevation at rest   Recommend trial of warm compresses and NSAIDs for relief of left medial/posterior ankle tenderness  Patient agreeable to plan without further questions  The following portions of the patient's history were reviewed and updated as appropriate: allergies, current medications, past family history, past medical history, past social history, past surgical history and problem list     Review of Systems   Constitutional: Negative  HENT: Negative  Eyes: Negative  Respiratory: Negative  Cardiovascular:        Painful veins   Gastrointestinal: Negative  Endocrine: Negative  Genitourinary: Negative  Musculoskeletal: Negative  Skin: Negative  Allergic/Immunologic: Negative  Neurological: Negative  Hematological: Negative  Psychiatric/Behavioral: Negative  I have reviewed and made appropriate changes to the review of systems input by the medical assistant  Objective:      /64 (BP Location: Left arm, Patient Position: Sitting)   Pulse 72   Temp 98 2 °F (36 8 °C) (Tympanic)   Ht 5' 3" (1 6 m)   Wt 69 4 kg (153 lb)   BMI 27 10 kg/m²          Physical Exam  Vitals reviewed  Constitutional:       Appearance: Normal appearance  Cardiovascular:      Rate and Rhythm: Normal rate and regular rhythm  Pulses: Normal pulses  Dorsalis pedis pulses are 2+ on the right side and 2+ on the left side  Heart sounds: Normal heart sounds  Pulmonary:      Effort: Pulmonary effort is normal       Breath sounds: Normal breath sounds  Musculoskeletal:      Right lower leg: No edema  Left lower leg: No edema  Skin:     General: Skin is warm and dry  Capillary Refill: Capillary refill takes less than 2 seconds  Comments: BLE telangiectasia L>R  L medial/posterior ankle tender/ cord? SVT?      LLE dilated reticular veins/ small varicosities   Neurological:      Mental Status: She is alert and oriented to person, place, and time  Psychiatric:         Behavior: Behavior normal            Vitals:    05/02/22 1606   BP: 124/64   BP Location: Left arm   Patient Position: Sitting   Pulse: 72   Temp: 98 2 °F (36 8 °C)   TempSrc: Tympanic   Weight: 69 4 kg (153 lb)   Height: 5' 3" (1 6 m)       Patient Active Problem List   Diagnosis    Macrocytosis without anemia    Primary generalized (osteo)arthritis    Fibromyalgia    Lumbar spondylosis    Primary osteoarthritis of both knees    Plantar fasciitis    Venous insufficiency       History reviewed  No pertinent surgical history  Family History   Problem Relation Age of Onset    Cancer Mother     Arthritis Mother     Cancer Father     Kidney cancer Sister     Diabetes Sister     Heart attack Brother        Social History     Socioeconomic History    Marital status:      Spouse name: Not on file    Number of children: Not on file    Years of education: Not on file    Highest education level: Not on file   Occupational History    Not on file   Tobacco Use    Smoking status: Never Smoker    Smokeless tobacco: Never Used   Vaping Use    Vaping Use: Never used   Substance and Sexual Activity    Alcohol use:  Yes     Alcohol/week: 7 0 standard drinks     Types: 7 Glasses of wine per week    Drug use: No    Sexual activity: Not on file   Other Topics Concern    Not on file   Social History Narrative    Not on file     Social Determinants of Health     Financial Resource Strain: Not on file   Food Insecurity: Not on file   Transportation Needs: Not on file   Physical Activity: Not on file   Stress: Not on file   Social Connections: Not on file   Intimate Partner Violence: Not on file   Housing Stability: Not on file       Allergies   Allergen Reactions    Codeine GI Intolerance    Prednisone Other (See Comments)     Shaking and hallucinations         Current Outpatient Medications:     Cholecalciferol (VITAMIN D3) 867893 UNIT/GM POWD, Take by mouth Daily, Disp: , Rfl:     Coconut Oil 1000 MG CAPS, daily, Disp: , Rfl:     Coenzyme Q10 (COQ-10) 10 MG CAPS, daily, Disp: , Rfl:     Flaxseed, Linseed, (FLAXSEED OIL) 1000 MG CAPS, daily, Disp: , Rfl:     Glucosamine-Chondroitin 6750-8708 MG/30ML LIQD, Take by mouth, Disp: , Rfl:     Milk Thistle 1000 MG CAPS, daily, Disp: , Rfl:

## 2022-05-02 NOTE — PATIENT INSTRUCTIONS
Continue daily use of medical grade compression stockings 20-30 mmHg  On a m , off in p m     Try Warm compress and NSAIDs for relief of left ankle discomfort  Left LEVDR- will call with results and plan for follow up

## 2022-05-02 NOTE — ASSESSMENT & PLAN NOTE
77-year-old female with fibromyalgia, osteoarthritis, macrocytosis without anemia, and venous insufficiency presents with complaints of LLE dilated veins and pain to left medial/posterior ankle  - c/o L medial/posterior ankle pain/tenderness  - BLE telangiectasia L>R (significant left ankle veins)  - tender to touch - SVT?  - patient wears daily compression  - no edema  - patient is not concerned with cosmetic aesthetic look of legs  - bilateral palpable DP pulse    Plan:   - continue use of medical grade compression stockings 20-30 mmHg  On a m , off in p m    - frequent ambulation, leg elevation at rest  - trial warm compress and NSAIDs for left ankle tenderness ? SVT  - left LEVDR- will call with results and plans for follow-up

## 2022-05-17 PROBLEM — M81.0 SENILE OSTEOPOROSIS: Status: ACTIVE | Noted: 2022-05-17

## 2022-06-06 ENCOUNTER — HOSPITAL ENCOUNTER (OUTPATIENT)
Dept: VASCULAR ULTRASOUND | Facility: HOSPITAL | Age: 72
Discharge: HOME | End: 2022-06-06
Payer: COMMERCIAL

## 2022-06-06 DIAGNOSIS — I87.2 VENOUS INSUFFICIENCY: ICD-10-CM

## 2022-06-06 PROCEDURE — 93971 EXTREMITY STUDY: CPT

## 2022-06-06 PROCEDURE — 93971 EXTREMITY STUDY: CPT | Performed by: SURGERY

## 2022-08-10 ENCOUNTER — RA CDI HCC (OUTPATIENT)
Dept: OTHER | Facility: HOSPITAL | Age: 72
End: 2022-08-10

## 2022-08-10 NOTE — PROGRESS NOTES
[facility]  coding opportunities       Chart reviewed, no opportunity found:   [address]        Patients Insurance     Medicare Insurance: [employer]

## 2022-08-22 ENCOUNTER — OFFICE VISIT (OUTPATIENT)
Dept: INTERNAL MEDICINE CLINIC | Facility: CLINIC | Age: 72
End: 2022-08-22
Payer: COMMERCIAL

## 2022-08-22 VITALS
HEIGHT: 63 IN | WEIGHT: 154.8 LBS | BODY MASS INDEX: 27.43 KG/M2 | OXYGEN SATURATION: 99 % | SYSTOLIC BLOOD PRESSURE: 138 MMHG | DIASTOLIC BLOOD PRESSURE: 76 MMHG | TEMPERATURE: 97.6 F | HEART RATE: 71 BPM | RESPIRATION RATE: 18 BRPM

## 2022-08-22 DIAGNOSIS — Z13.9 SCREENING DUE: ICD-10-CM

## 2022-08-22 DIAGNOSIS — I87.2 VENOUS INSUFFICIENCY: Primary | ICD-10-CM

## 2022-08-22 DIAGNOSIS — Z11.59 NEED FOR HEPATITIS C SCREENING TEST: ICD-10-CM

## 2022-08-22 DIAGNOSIS — M17.0 PRIMARY OSTEOARTHRITIS OF BOTH KNEES: ICD-10-CM

## 2022-08-22 PROCEDURE — 1160F RVW MEDS BY RX/DR IN RCRD: CPT | Performed by: FAMILY MEDICINE

## 2022-08-22 PROCEDURE — 99214 OFFICE O/P EST MOD 30 MIN: CPT | Performed by: FAMILY MEDICINE

## 2022-08-22 PROCEDURE — 3725F SCREEN DEPRESSION PERFORMED: CPT | Performed by: FAMILY MEDICINE

## 2022-08-22 RX ORDER — COVID-19 MOLECULAR TEST ASSAY
KIT MISCELLANEOUS
COMMUNITY
Start: 2022-08-06 | End: 2022-10-04 | Stop reason: ALTCHOICE

## 2022-08-22 NOTE — PROGRESS NOTES
FOLLOW-UP OFFICE VISIT  [facility] - [facility] OF [facility] RINA MARINELLI    NAME: Asha Estrada May  AGE: 81 y o  SEX: female  : 1950     DATE: 2022     Assessment and Plan:     Problem List Items Addressed This Visit        Cardiovascular and Mediastinum    Venous insufficiency - Primary       Musculoskeletal and Integument    Primary osteoarthritis of both knees      Other Visit Diagnoses     Need for hepatitis C screening test        Relevant Orders    Hepatitis C Antibody ([facility])    Encounter for screening mammogram for breast cancer        Screening due        Relevant Orders    Comprehensive metabolic panel    Lipid Panel with Direct LDL reflex    CBC and differential      pt medically stable  OA response to prn NSAID therapy  Will f/u with vascular for venus insufficieny and pain  Pt reports having mammo scheduled for later in the year  No follow-ups on file  Chief Complaint:     Chief Complaint   Patient presents with    Physical Exam     Pt states that she is here for check up she has veins on her left ankle one red spot that's painful she is waiting on results from the vascular doctor        History of Present Illness:   Presents for follow up  Has been seeing vascular for painful veins  Had a recent ultrasound of the vens but hasn't heard from vascular yet  Had covid 19 infection 6 weeks ago  Recovering well     last mammo in April of this year and it was  nromal       Review of Systems:     Review of Systems   Constitutional: Negative for fever  Respiratory: Negative for shortness of breath  Cardiovascular: Negative for chest pain  Gastrointestinal: Negative for constipation and diarrhea  Musculoskeletal: Positive for arthralgias          Problem List:     Patient Active Problem List   Diagnosis    Macrocytosis without anemia    Primary generalized (osteo)arthritis    Fibromyalgia    Lumbar spondylosis    Primary osteoarthritis of both knees    Plantar fasciitis    Venous insufficiency    Senile osteoporosis        Objective:     /76 (BP Location: Left arm, Patient Position: Sitting, Cuff Size: Standard)   Pulse 71   Temp 97 6 °F (36 4 °C) (Temporal)   Resp 18   Ht 5' 3" (1 6 m)   Wt 70 2 kg (154 lb 12 8 oz)   SpO2 99%   BMI 27 42 kg/m²     Physical Exam  HENT:      Head: Normocephalic and atraumatic  Cardiovascular:      Rate and Rhythm: Normal rate and regular rhythm  Heart sounds: No murmur heard  Pulmonary:      Effort: Pulmonary effort is normal       Breath sounds: Normal breath sounds  Abdominal:      General: Bowel sounds are normal       Palpations: Abdomen is soft  Neurological:      Mental Status: She is alert  Pertinent Laboratory/Diagnostic Studies:    Laboratory Results: I have personally reviewed the pertinent laboratory results/reports     Chemistry Profile:   Results from Last 12 Months   Lab Units 09/11/21  1101   POTASSIUM mmol/L 4 4   CHLORIDE mmol/L 99*   CO2 mmol/L 27   BUN mg/dL 14   CREATININE mg/dL 0 52*   GLUCOSE FASTING mg/dL 87   CALCIUM mg/dL 8 6   AST U/L 39   ALT U/L 47   ALK PHOS U/L 61   EGFR ml/min/1 73sq m 98       Radiology/Other Diagnostic Testing Results: I have personally reviewed pertinent reports          Pernell AMARAL HSPTLBelinda [facility]  8/22/2022 4:04 PM

## 2022-09-07 ENCOUNTER — APPOINTMENT (OUTPATIENT)
Dept: LAB | Facility: HOSPITAL | Age: 72
End: 2022-09-07
Payer: COMMERCIAL

## 2022-09-07 DIAGNOSIS — Z11.59 NEED FOR HEPATITIS C SCREENING TEST: ICD-10-CM

## 2022-09-07 DIAGNOSIS — Z13.9 SCREENING DUE: ICD-10-CM

## 2022-09-07 LAB
ALBUMIN SERPL BCP-MCNC: 3.8 G/DL (ref 3.5–5)
ALP SERPL-CCNC: 58 U/L (ref 46–116)
ALT SERPL W P-5'-P-CCNC: 32 U/L (ref 12–78)
ANION GAP SERPL CALCULATED.3IONS-SCNC: 9 MMOL/L (ref 4–13)
AST SERPL W P-5'-P-CCNC: 21 U/L (ref 5–45)
BASOPHILS # BLD AUTO: 0.06 THOUSANDS/ΜL (ref 0–0.1)
BASOPHILS NFR BLD AUTO: 2 % (ref 0–1)
BILIRUB SERPL-MCNC: 0.93 MG/DL (ref 0.2–1)
BUN SERPL-MCNC: 13 MG/DL (ref 5–25)
CALCIUM SERPL-MCNC: 8.8 MG/DL (ref 8.3–10.1)
CHLORIDE SERPL-SCNC: 99 MMOL/L (ref 96–108)
CHOLEST SERPL-MCNC: 206 MG/DL
CO2 SERPL-SCNC: 27 MMOL/L (ref 21–32)
CREAT SERPL-MCNC: 0.53 MG/DL (ref 0.6–1.3)
EOSINOPHIL # BLD AUTO: 0.1 THOUSAND/ΜL (ref 0–0.61)
EOSINOPHIL NFR BLD AUTO: 2 % (ref 0–6)
ERYTHROCYTE [DISTWIDTH] IN BLOOD BY AUTOMATED COUNT: 13.6 % (ref 11.6–15.1)
GFR SERPL CREATININE-BSD FRML MDRD: 96 ML/MIN/1.73SQ M
GLUCOSE P FAST SERPL-MCNC: 101 MG/DL (ref 65–99)
HCT VFR BLD AUTO: 40.7 % (ref 34.8–46.1)
HCV AB SER QL: NORMAL
HDLC SERPL-MCNC: 106 MG/DL
HGB BLD-MCNC: 13.9 G/DL (ref 11.5–15.4)
IMM GRANULOCYTES # BLD AUTO: 0.02 THOUSAND/UL (ref 0–0.2)
IMM GRANULOCYTES NFR BLD AUTO: 1 % (ref 0–2)
LDLC SERPL CALC-MCNC: 92 MG/DL (ref 0–100)
LYMPHOCYTES # BLD AUTO: 1.48 THOUSANDS/ΜL (ref 0.6–4.47)
LYMPHOCYTES NFR BLD AUTO: 36 % (ref 14–44)
MCH RBC QN AUTO: 35.2 PG (ref 26.8–34.3)
MCHC RBC AUTO-ENTMCNC: 34.2 G/DL (ref 31.4–37.4)
MCV RBC AUTO: 103 FL (ref 82–98)
MONOCYTES # BLD AUTO: 0.48 THOUSAND/ΜL (ref 0.17–1.22)
MONOCYTES NFR BLD AUTO: 12 % (ref 4–12)
NEUTROPHILS # BLD AUTO: 1.98 THOUSANDS/ΜL (ref 1.85–7.62)
NEUTS SEG NFR BLD AUTO: 47 % (ref 43–75)
NRBC BLD AUTO-RTO: 0 /100 WBCS
PLATELET # BLD AUTO: 210 THOUSANDS/UL (ref 149–390)
PMV BLD AUTO: 11.1 FL (ref 8.9–12.7)
POTASSIUM SERPL-SCNC: 4.7 MMOL/L (ref 3.5–5.3)
PROT SERPL-MCNC: 7 G/DL (ref 6.4–8.4)
RBC # BLD AUTO: 3.95 MILLION/UL (ref 3.81–5.12)
SODIUM SERPL-SCNC: 135 MMOL/L (ref 135–147)
TRIGL SERPL-MCNC: 42 MG/DL
WBC # BLD AUTO: 4.12 THOUSAND/UL (ref 4.31–10.16)

## 2022-09-07 PROCEDURE — 85025 COMPLETE CBC W/AUTO DIFF WBC: CPT

## 2022-09-07 PROCEDURE — 86803 HEPATITIS C AB TEST: CPT

## 2022-09-07 PROCEDURE — 80053 COMPREHEN METABOLIC PANEL: CPT

## 2022-09-07 PROCEDURE — 36415 COLL VENOUS BLD VENIPUNCTURE: CPT

## 2022-09-07 PROCEDURE — 80061 LIPID PANEL: CPT

## 2022-09-12 ENCOUNTER — TELEPHONE (OUTPATIENT)
Dept: INTERNAL MEDICINE CLINIC | Facility: CLINIC | Age: 72
End: 2022-09-12

## 2022-09-12 NOTE — TELEPHONE ENCOUNTER
----- Message from Lanny Levy DO sent at 9/12/2022  9:24 AM EDT -----  Blood work looks ok  Cholesterol and blood sugar has improved

## 2022-10-04 ENCOUNTER — OFFICE VISIT (OUTPATIENT)
Dept: VASCULAR SURGERY | Facility: CLINIC | Age: 72
End: 2022-10-04
Payer: COMMERCIAL

## 2022-10-04 VITALS
HEIGHT: 63 IN | HEART RATE: 66 BPM | BODY MASS INDEX: 27.29 KG/M2 | DIASTOLIC BLOOD PRESSURE: 78 MMHG | SYSTOLIC BLOOD PRESSURE: 132 MMHG | WEIGHT: 154 LBS

## 2022-10-04 DIAGNOSIS — I87.2 VENOUS INSUFFICIENCY: Primary | ICD-10-CM

## 2022-10-04 PROCEDURE — 99212 OFFICE O/P EST SF 10 MIN: CPT | Performed by: SURGERY

## 2022-10-04 NOTE — ASSESSMENT & PLAN NOTE
Venous stasis dermatitis versus superficial phlebitis in the lower left ankle  It has improved over time with compression stockings and regular use of moisturizer treatment  Reviewed venous doppler which shows reflux in the distal  that could be contributing  If she develops any wound then we could consider  sclerotherapy

## 2022-10-04 NOTE — PROGRESS NOTES
Assessment/Plan:    Venous insufficiency  Venous stasis dermatitis versus superficial phlebitis in the lower left ankle  It has improved over time with compression stockings and regular use of moisturizer treatment  Reviewed venous doppler which shows reflux in the distal  that could be contributing  If she develops any wound then we could consider  sclerotherapy  Diagnoses and all orders for this visit:    Venous insufficiency        Subjective:      Patient ID: Dee Bryant is a 81 y o  female  Pt presents today to rev LEVDR done on 6/06/22 for LLE spider veins  HPI  Pt is feeling much better in the lower leg area of redness and tenderness  No leg swelling  She works as  and stands for prolonged period daily  She uses compression stockings daily  The following portions of the patient's history were reviewed and updated as appropriate: allergies, current medications, past family history, past medical history, past social history, past surgical history and problem list     Review of Systems   Constitutional: Negative  HENT: Negative  Eyes: Negative  Respiratory: Negative  Cardiovascular: Negative  Gastrointestinal: Negative  Endocrine: Negative  Genitourinary: Negative  Musculoskeletal: Negative  Skin: Positive for color change  Allergic/Immunologic: Negative  Neurological: Negative  Hematological: Negative  Psychiatric/Behavioral: Negative  I have reviewed the ROS as entered and made changes as necessary  Objective:      /78 (BP Location: Left arm, Patient Position: Sitting, Cuff Size: Standard)   Pulse 66   Ht 5' 3" (1 6 m)   Wt 69 9 kg (154 lb)   BMI 27 28 kg/m²          Physical Exam  Vitals and nursing note reviewed  Constitutional:       Appearance: Normal appearance  HENT:      Head: Normocephalic and atraumatic  Cardiovascular:      Rate and Rhythm: Normal rate and regular rhythm  Musculoskeletal:      Right lower leg: No edema  Left lower leg: No edema  Skin:     General: Skin is warm and dry  Capillary Refill: Capillary refill takes less than 2 seconds  Comments: Left medial malleolus has area of lipodermatosclerosis with discoloration  Neurological:      General: No focal deficit present  Mental Status: She is alert and oriented to person, place, and time     Psychiatric:         Mood and Affect: Mood normal          Behavior: Behavior normal

## 2022-10-04 NOTE — PATIENT INSTRUCTIONS
Venous insufficiency  Venous stasis dermatitis versus superficial phlebitis in the lower left ankle  It has improved over time with compression stockings and regular use of moisturizer treatment  Reviewed venous doppler which shows reflux in the distal  / connector by the ankle that could be contributing to this issue  If she develops any wound then we could consider  sclerotherapy

## 2022-10-12 ENCOUNTER — TELEPHONE (OUTPATIENT)
Dept: INTERNAL MEDICINE CLINIC | Facility: CLINIC | Age: 72
End: 2022-10-12

## 2022-10-12 NOTE — TELEPHONE ENCOUNTER
Patient would like to know if there is record of when she had a tetanus vaccine  She is going to be around a new baby and was told she has to have one  If she needs one, she would like to schedule it

## 2022-12-05 PROBLEM — D72.819 CHRONIC LEUKOPENIA: Status: ACTIVE | Noted: 2022-12-05

## 2022-12-05 PROBLEM — E55.9 VITAMIN D INSUFFICIENCY: Status: ACTIVE | Noted: 2022-12-05

## 2023-01-24 ENCOUNTER — CONSULT (OUTPATIENT)
Dept: INTERNAL MEDICINE CLINIC | Facility: CLINIC | Age: 73
End: 2023-01-24

## 2023-01-24 VITALS
WEIGHT: 155 LBS | SYSTOLIC BLOOD PRESSURE: 128 MMHG | OXYGEN SATURATION: 96 % | DIASTOLIC BLOOD PRESSURE: 80 MMHG | HEART RATE: 64 BPM | TEMPERATURE: 97.5 F | HEIGHT: 63 IN | BODY MASS INDEX: 27.46 KG/M2 | RESPIRATION RATE: 18 BRPM

## 2023-01-24 DIAGNOSIS — Z01.818 PREOP GENERAL PHYSICAL EXAM: Primary | ICD-10-CM

## 2023-01-24 RX ORDER — OFLOXACIN 3 MG/ML
SOLUTION/ DROPS OPHTHALMIC
COMMUNITY
Start: 2023-01-06

## 2023-01-24 RX ORDER — PREDNISOLONE ACETATE 10 MG/ML
SUSPENSION/ DROPS OPHTHALMIC
COMMUNITY
Start: 2023-01-06

## 2023-01-24 RX ORDER — KETOROLAC TROMETHAMINE 5 MG/ML
SOLUTION OPHTHALMIC
COMMUNITY
Start: 2023-01-06

## 2023-01-24 NOTE — PROGRESS NOTES
Presurgical Evaluation    Subjective:      Patient ID: Mane Bryant is a 81 y o  female  Chief Complaint   Patient presents with   • Pre-op Exam     Catract surgery  (left eye) 02/01       HPI    The following portions of the patient's history were reviewed and updated as appropriate: allergies, current medications, past medical history, past social history and past surgical history  Procedure date: 2/1/2023    Surgeon:  Primo De Souza  Planned procedure:  cataract  Diagnosis for procedure:  Blurred vision    Prior anesthesia: No    CAD History: None   NOTE: Patient should see Cardiology if time available before surgery, and if appropriate  Pulmonary History: None    Renal history: None    Diabetes History:  None     Neurological History: None     On Immunosuppressant meds/biologics: No      Review of Systems      Current Outpatient Medications   Medication Sig Dispense Refill   • Cholecalciferol (VITAMIN D3) 938335 UNIT/GM POWD Take by mouth Daily     • Coconut Oil 1000 MG CAPS daily     • Coenzyme Q10 (COQ-10) 10 MG CAPS daily     • Flaxseed, Linseed, (FLAXSEED OIL) 1000 MG CAPS daily     • Glucosamine-Chondroitin 8102-6880 MG/30ML LIQD Take by mouth     • ketorolac (ACULAR) 0 5 % ophthalmic solution Not yet using  01/24/23     • Milk Thistle 1000 MG CAPS daily     • ofloxacin (OCUFLOX) 0 3 % ophthalmic solution Not yet using  01/24/23     • prednisoLONE acetate (PRED FORTE) 1 % ophthalmic suspension Not yet using  01/24/23       No current facility-administered medications for this visit         Allergies on file:   Codeine and Prednisone    Patient Active Problem List   Diagnosis   • Macrocytosis without anemia   • Primary generalized (osteo)arthritis   • Fibromyalgia   • Lumbar spondylosis   • Primary osteoarthritis of both knees   • Plantar fasciitis   • Venous insufficiency   • Senile osteoporosis   • Vitamin D insufficiency   • Chronic leukopenia        Past Medical History:   Diagnosis Date   • Disease of thyroid gland    • Fibromyalgia, primary    • Fractures    • Hyperlipemia    • Lumbar spondylosis    • Osteoarthritis    • Venous insufficiency    • Vertigo        History reviewed  No pertinent surgical history  Family History   Problem Relation Age of Onset   • Cancer Mother    • Arthritis Mother    • Cancer Father    • Kidney cancer Sister    • Diabetes Sister    • Heart attack Brother        Social History     Tobacco Use   • Smoking status: Never   • Smokeless tobacco: Never   Vaping Use   • Vaping Use: Never used   Substance Use Topics   • Alcohol use: Yes     Alcohol/week: 7 0 standard drinks     Types: 7 Glasses of wine per week   • Drug use: No       Objective:    Vitals:    23 1401   BP: 128/80   BP Location: Left arm   Patient Position: Sitting   Cuff Size: Standard   Pulse: 64   Resp: 18   Temp: 97 5 °F (36 4 °C)   TempSrc: Temporal   SpO2: 96%   Weight: 70 3 kg (155 lb)   Height: 5' 3" (1 6 m)        Physical Exam      Preop labs/testing available and reviewed: no               EKG no    Echo no    Stress test/cath no    PFT/Bob no    Functional capacity: Dancing                              4 8 Mets   Pick the highest level patient can comfortably perform   4 mets or greater for surgery    RCRI  High Risk surgery? 1 Point  CAD History:         1 Point   MI; Positive Stress Test; CP due to Mi;  Nitrate Usage to control Angina; Pathologic Q wave on EKG  CHF Active:         1 Point   Pulm Edema; Paroxysmal Nocturnal Dyspnea;  Bibasilar Rales (crackles);S3; CHF on CXR  Cerebrovascular Disease (TIA or CVA):     1 Point  DM on Insulin:        1 Point  Serum Creat >2 0 mg/dl:       1 Point          Total Points: 0     Scorin: Class I, Very Low Risk (0 4%)     1: Class II, Low risk (0 9%)     2: Class III Moderate (6 6%)     3: Class IV High (>11%)      MILDRED Risk:  GFR:        For PCP:  If GFR>60, Hold ACE/ARB/Diuretic on the day of surgery, and NSAIDS 10 days before      If GFR<45, Consider PRE and POST op Nephrology Consult  If 46 <GFR> 59 : Has Patient had MILDRED in last 6 Months? no   If YES: Preop Nephrology consult   If No:  Oc Ramírez Nephrology consult  Assessment/Plan:    Patient is medically optimized (cleared) for the planned procedure  Further testing/evaluation is not required  Postop concerns: no    Problem List Items Addressed This Visit    None       Diagnoses and all orders for this visit:    Preop general physical exam    Other orders  -     ketorolac (ACULAR) 0 5 % ophthalmic solution; Not yet using  01/24/23  -     ofloxacin (OCUFLOX) 0 3 % ophthalmic solution; Not yet using  01/24/23  -     prednisoLONE acetate (PRED FORTE) 1 % ophthalmic suspension; Not yet using   01/24/23        Pre-Surgery Instructions:   Medication Instructions   • Cholecalciferol (VITAMIN D3) 484906 UNIT/GM POWD per anesthesia guidelines    • Coconut Oil 1000 MG CAPS per anesthesia guidelines    • Coenzyme Q10 (COQ-10) 10 MG CAPS per anesthesia guidelines    • Flaxseed, Linseed, (FLAXSEED OIL) 1000 MG CAPS per anesthesia guidelines    • Glucosamine-Chondroitin 9020-3431 MG/30ML LIQD per anesthesia guidelines    • ketorolac (ACULAR) 0 5 % ophthalmic solution per anesthesia guidelines    • Milk Thistle 1000 MG CAPS per anesthesia guidelines    • ofloxacin (OCUFLOX) 0 3 % ophthalmic solution per anesthesia guidelines    • prednisoLONE acetate (PRED FORTE) 1 % ophthalmic suspension per anesthesia guidelines         NOTE: Please use the above to review important meds for your specialty, the remainder "per anesthesia Guidelines "    NOTE: Please place an Inbasket message for "[facility]" pool for

## 2023-01-25 ENCOUNTER — TELEPHONE (OUTPATIENT)
Dept: INTERNAL MEDICINE CLINIC | Facility: CLINIC | Age: 73
End: 2023-01-25

## 2023-06-01 ENCOUNTER — VBI (OUTPATIENT)
Dept: ADMINISTRATIVE | Facility: OTHER | Age: 73
End: 2023-06-01

## 2023-06-06 ENCOUNTER — HOSPITAL ENCOUNTER (OUTPATIENT)
Dept: VASCULAR ULTRASOUND | Facility: HOSPITAL | Age: 73
Discharge: HOME | End: 2023-06-06
Payer: COMMERCIAL

## 2023-06-06 DIAGNOSIS — M71.21 BAKER'S CYST, RIGHT: ICD-10-CM

## 2023-06-06 DIAGNOSIS — I87.2 CHRONIC VENOUS INSUFFICIENCY: ICD-10-CM

## 2023-06-06 PROCEDURE — 93970 EXTREMITY STUDY: CPT | Performed by: SURGERY

## 2023-06-06 PROCEDURE — 93970 EXTREMITY STUDY: CPT

## 2023-06-12 ENCOUNTER — HOSPITAL ENCOUNTER (OUTPATIENT)
Age: 73
Discharge: HOME | End: 2023-06-12
Payer: COMMERCIAL

## 2023-06-12 VITALS — HEIGHT: 62 IN | BODY MASS INDEX: 28.33 KG/M2

## 2023-06-12 DIAGNOSIS — M81.0 SENILE OSTEOPOROSIS: ICD-10-CM

## 2023-06-12 PROCEDURE — 77080 DXA BONE DENSITY AXIAL: CPT

## 2023-06-14 ENCOUNTER — APPOINTMENT (OUTPATIENT)
Dept: LAB | Facility: HOSPITAL | Age: 73
End: 2023-06-14
Payer: COMMERCIAL

## 2023-06-14 DIAGNOSIS — I87.2 CHRONIC VENOUS INSUFFICIENCY: ICD-10-CM

## 2023-06-14 DIAGNOSIS — M15.0 PRIMARY GENERALIZED (OSTEO)ARTHRITIS: ICD-10-CM

## 2023-06-14 DIAGNOSIS — E55.9 VITAMIN D INSUFFICIENCY: ICD-10-CM

## 2023-06-14 DIAGNOSIS — Z79.899 ENCOUNTER FOR LONG-TERM (CURRENT) USE OF OTHER MEDICATIONS: ICD-10-CM

## 2023-06-14 LAB
25(OH)D3 SERPL-MCNC: 42.1 NG/ML (ref 30–100)
ALBUMIN SERPL BCP-MCNC: 4.1 G/DL (ref 3.5–5)
ALP SERPL-CCNC: 49 U/L (ref 34–104)
ALT SERPL W P-5'-P-CCNC: 19 U/L (ref 7–52)
ANION GAP SERPL CALCULATED.3IONS-SCNC: 6 MMOL/L (ref 4–13)
AST SERPL W P-5'-P-CCNC: 22 U/L (ref 13–39)
BASOPHILS # BLD AUTO: 0.06 THOUSANDS/ÂΜL (ref 0–0.1)
BASOPHILS NFR BLD AUTO: 2 % (ref 0–1)
BILIRUB SERPL-MCNC: 0.8 MG/DL (ref 0.2–1)
BUN SERPL-MCNC: 19 MG/DL (ref 5–25)
CALCIUM SERPL-MCNC: 9 MG/DL (ref 8.4–10.2)
CHLORIDE SERPL-SCNC: 99 MMOL/L (ref 96–108)
CO2 SERPL-SCNC: 27 MMOL/L (ref 21–32)
CREAT SERPL-MCNC: 0.56 MG/DL (ref 0.6–1.3)
CRP SERPL QL: 1.6 MG/L
EOSINOPHIL # BLD AUTO: 0.11 THOUSAND/ÂΜL (ref 0–0.61)
EOSINOPHIL NFR BLD AUTO: 3 % (ref 0–6)
ERYTHROCYTE [DISTWIDTH] IN BLOOD BY AUTOMATED COUNT: 13.3 % (ref 11.6–15.1)
ERYTHROCYTE [SEDIMENTATION RATE] IN BLOOD: <1 MM/HOUR (ref 0–29)
GFR SERPL CREATININE-BSD FRML MDRD: 94 ML/MIN/1.73SQ M
GLUCOSE P FAST SERPL-MCNC: 97 MG/DL (ref 65–99)
HCT VFR BLD AUTO: 42.8 % (ref 34.8–46.1)
HGB BLD-MCNC: 15 G/DL (ref 11.5–15.4)
IMM GRANULOCYTES # BLD AUTO: 0.01 THOUSAND/UL (ref 0–0.2)
IMM GRANULOCYTES NFR BLD AUTO: 0 % (ref 0–2)
LYMPHOCYTES # BLD AUTO: 1.13 THOUSANDS/ÂΜL (ref 0.6–4.47)
LYMPHOCYTES NFR BLD AUTO: 29 % (ref 14–44)
MCH RBC QN AUTO: 35.4 PG (ref 26.8–34.3)
MCHC RBC AUTO-ENTMCNC: 35 G/DL (ref 31.4–37.4)
MCV RBC AUTO: 101 FL (ref 82–98)
MONOCYTES # BLD AUTO: 0.41 THOUSAND/ÂΜL (ref 0.17–1.22)
MONOCYTES NFR BLD AUTO: 10 % (ref 4–12)
NEUTROPHILS # BLD AUTO: 2.24 THOUSANDS/ÂΜL (ref 1.85–7.62)
NEUTS SEG NFR BLD AUTO: 56 % (ref 43–75)
NRBC BLD AUTO-RTO: 0 /100 WBCS
PLATELET # BLD AUTO: 208 THOUSANDS/UL (ref 149–390)
PMV BLD AUTO: 10.8 FL (ref 8.9–12.7)
POTASSIUM SERPL-SCNC: 4.4 MMOL/L (ref 3.5–5.3)
PROT SERPL-MCNC: 7 G/DL (ref 6.4–8.4)
RBC # BLD AUTO: 4.24 MILLION/UL (ref 3.81–5.12)
SODIUM SERPL-SCNC: 132 MMOL/L (ref 135–147)
WBC # BLD AUTO: 3.96 THOUSAND/UL (ref 4.31–10.16)

## 2023-06-14 PROCEDURE — 80053 COMPREHEN METABOLIC PANEL: CPT

## 2023-06-14 PROCEDURE — 86140 C-REACTIVE PROTEIN: CPT

## 2023-06-14 PROCEDURE — 82306 VITAMIN D 25 HYDROXY: CPT

## 2023-06-14 PROCEDURE — 85652 RBC SED RATE AUTOMATED: CPT

## 2023-06-14 PROCEDURE — 36415 COLL VENOUS BLD VENIPUNCTURE: CPT

## 2023-06-14 PROCEDURE — 85025 COMPLETE CBC W/AUTO DIFF WBC: CPT

## 2023-08-27 ENCOUNTER — RA CDI HCC (OUTPATIENT)
Dept: OTHER | Facility: HOSPITAL | Age: 73
End: 2023-08-27

## 2023-08-27 NOTE — PROGRESS NOTES
[address] [facility] coding opportunities       Chart reviewed, no opportunity found: [id number]        Patients Insurance     Medicare Insurance: [employer]

## 2023-09-06 ENCOUNTER — OFFICE VISIT (OUTPATIENT)
Age: 73
End: 2023-09-06
Payer: COMMERCIAL

## 2023-09-06 VITALS
DIASTOLIC BLOOD PRESSURE: 66 MMHG | WEIGHT: 155.4 LBS | HEART RATE: 69 BPM | SYSTOLIC BLOOD PRESSURE: 110 MMHG | OXYGEN SATURATION: 98 % | RESPIRATION RATE: 18 BRPM | BODY MASS INDEX: 28.89 KG/M2 | TEMPERATURE: 97.5 F

## 2023-09-06 DIAGNOSIS — Z00.00 ENCOUNTER FOR SUBSEQUENT ANNUAL WELLNESS VISIT (AWV) IN MEDICARE PATIENT: ICD-10-CM

## 2023-09-06 DIAGNOSIS — M15.0 PRIMARY GENERALIZED (OSTEO)ARTHRITIS: Primary | ICD-10-CM

## 2023-09-06 DIAGNOSIS — Z12.31 BREAST CANCER SCREENING BY MAMMOGRAM: ICD-10-CM

## 2023-09-06 DIAGNOSIS — M17.0 PRIMARY OSTEOARTHRITIS OF BOTH KNEES: ICD-10-CM

## 2023-09-06 DIAGNOSIS — M79.7 FIBROMYALGIA: ICD-10-CM

## 2023-09-06 PROCEDURE — G0439 PPPS, SUBSEQ VISIT: HCPCS | Performed by: FAMILY MEDICINE

## 2023-09-06 PROCEDURE — 99214 OFFICE O/P EST MOD 30 MIN: CPT | Performed by: FAMILY MEDICINE

## 2023-09-06 NOTE — PROGRESS NOTES
Assessment and Plan:     Problem List Items Addressed This Visit        Musculoskeletal and Integument    Primary generalized (osteo)arthritis -see below    Primary osteoarthritis of both knees-longstanding issue. Patient uses as needed NSAIDs for pain control. Patient encouraged to follow-up orthopedics for intra-articular therapy. Other    Fibromyalgia-chronic issue. Follows with rheumatology. Not currently the pain related therapies such as Lyrica or Cymbalta. Other Visit Diagnoses     Encounter for subsequent annual wellness visit (AWV) in Medicare patient        Relevant Orders    Comprehensive metabolic panel    CBC and differential    Breast cancer screening by mammogram        Relevant Orders    Mammo screening bilateral w 3d & cad           Preventive health issues were discussed with patient, and age appropriate screening tests were ordered as noted in patient's After Visit Summary. Personalized health advice and appropriate referrals for health education or preventive services given if needed, as noted in patient's After Visit Summary. History of Present Illness:     Patient presents for a Medicare Wellness Visit       Patient Care Team:  Wandy Linares DO as PCP - General (Family Medicine)     Review of Systems:     Review of Systems   Respiratory: Negative for shortness of breath. Cardiovascular: Negative for chest pain. Gastrointestinal: Negative for constipation and diarrhea. Musculoskeletal: Positive for arthralgias.         Problem List:     Patient Active Problem List   Diagnosis   • Macrocytosis without anemia   • Primary generalized (osteo)arthritis   • Fibromyalgia   • Lumbar spondylosis   • Primary osteoarthritis of both knees   • Plantar fasciitis   • Venous insufficiency   • Senile osteoporosis   • Vitamin D insufficiency   • Chronic leukopenia      Past Medical and Surgical History:     Past Medical History:   Diagnosis Date   • Disease of thyroid gland    • Fibromyalgia, primary    • Fractures    • Hyperlipemia    • Lumbar spondylosis    • Osteoarthritis    • Venous insufficiency    • Vertigo      Past Surgical History:   Procedure Laterality Date   • EYE SURGERY Bilateral       Family History:     Family History   Problem Relation Age of Onset   • Cancer Mother    • Arthritis Mother    • Cancer Father    • Kidney cancer Sister    • Diabetes Sister    • Heart attack Brother       Social History:     Social History     Socioeconomic History   • Marital status:      Spouse name: None   • Number of children: None   • Years of education: None   • Highest education level: None   Occupational History   • None   Tobacco Use   • Smoking status: Never   • Smokeless tobacco: Never   Vaping Use   • Vaping Use: Never used   Substance and Sexual Activity   • Alcohol use: Yes     Alcohol/week: 8.0 standard drinks of alcohol     Types: 8 Glasses of wine per week   • Drug use: No   • Sexual activity: None   Other Topics Concern   • None   Social History Narrative   • None     Social Determinants of Health     Financial Resource Strain: Medium Risk (9/6/2023)    Overall Financial Resource Strain (CARDIA)    • Difficulty of Paying Living Expenses: Somewhat hard   Food Insecurity: Not on file   Transportation Needs: No Transportation Needs (9/6/2023)    PRAPARE - Transportation    • Lack of Transportation (Medical): No    • Lack of Transportation (Non-Medical): No   Physical Activity: Not on file   Stress: No Stress Concern Present (1/24/2023)    [facility]    • Feeling of Stress :  Only a little   Social Connections: Not on file   Intimate Partner Violence: Not on file   Housing Stability: Not on file      Medications and Allergies:     Current Outpatient Medications   Medication Sig Dispense Refill   • Cholecalciferol (VITAMIN D3) 990974 UNIT/GM POWD Take by mouth Daily     • Coconut Oil 1000 MG CAPS daily     • Flaxseed, Linseed, (FLAXSEED OIL) 1000 MG CAPS daily     • Milk Thistle 1000 MG CAPS daily     • Coenzyme Q10 (COQ-10) 10 MG CAPS daily (Patient not taking: Reported on 9/6/2023)     • Glucosamine-Chondroitin 4498-6023 MG/30ML LIQD Take by mouth (Patient not taking: Reported on 9/6/2023)       No current facility-administered medications for this visit. Allergies   Allergen Reactions   • Codeine GI Intolerance   • Prednisone Other (See Comments)     Shaking and hallucinations      Immunizations:     Immunization History   Administered Date(s) Administered   • COVID-19 PFIZER VACCINE 0.3 ML IM 03/17/2021, 04/09/2021   • Tdap 07/04/2020      Health Maintenance:         Topic Date Due   • Breast Cancer Screening: Mammogram  Never done   • Colorectal Cancer Screening  10/05/2024   • Hepatitis C Screening  Completed         Topic Date Due   • Pneumococcal Vaccine: 65+ Years (1 - PCV) Never done   • COVID-19 Vaccine (3 - Pfizer series) 06/04/2021   • Influenza Vaccine (1) 09/01/2023      Medicare Screening Tests and Risk Assessments:     Mario Gomes is here for her Subsequent Wellness visit. Health Risk Assessment:   Patient rates overall health as very good. Patient feels that their physical health rating is same. Patient is satisfied with their life. Eyesight was rated as same. Hearing was rated as same. Patient feels that their emotional and mental health rating is same. Patients states they are never, rarely angry. Patient states they are sometimes unusually tired/fatigued. Pain experienced in the last 7 days has been some. Patient's pain rating has been 7/10. Patient states that she has experienced no weight loss or gain in last 6 months. Depression Screening:   PHQ-2 Score: 0      Fall Risk Screening: In the past year, patient has experienced: no history of falling in past year      Urinary Incontinence Screening:   Patient has not leaked urine accidently in the last six months.      Home Safety:  Patient has trouble with stairs inside or outside of their home. Patient has working smoke alarms and has working carbon monoxide detector. Home safety hazards include: none. Nutrition:   Current diet is Low Carb. Healthy limited meat some fruit lots of water or vitamin water low carbs    Medications:   Patient is not currently taking any over-the-counter supplements. Patient is not able to manage medications. Doesn't take  prescription medication    Activities of Daily Living (ADLs)/Instrumental Activities of Daily Living (IADLs):   Walk and transfer into and out of bed and chair?: Yes  Dress and groom yourself?: Yes    Bathe or shower yourself?: Yes    Feed yourself? Yes  Do your laundry/housekeeping?: Yes  Manage your money, pay your bills and track your expenses?: Yes  Make your own meals?: Yes    Do your own shopping?: Yes    Previous Hospitalizations:   Any hospitalizations or ED visits within the last 12 months?: No      Advance Care Planning:   Living will: Yes    Advanced directive: Yes      PREVENTIVE SCREENINGS      Cardiovascular Screening:    General: Screening Current      Diabetes Screening:     General: Screening Current      Colorectal Cancer Screening:     General: Screening Current      Cervical Cancer Screening:    General: Screening Not Indicated      Osteoporosis Screening:    General: Screening Not Indicated and History Osteoporosis      Lung Cancer Screening:     General: Screening Not Indicated      Hepatitis C Screening:    General: Screening Current    Screening, Brief Intervention, and Referral to Treatment (SBIRT)    Screening  Typical number of drinks in a day: 2  Typical number of drinks in a week: 8  Interpretation: Low risk drinking behavior.     Single Item Drug Screening:  How often have you used an illegal drug (including marijuana) or a prescription medication for non-medical reasons in the past year? never    Single Item Drug Screen Score: 0  Interpretation: Negative screen for possible drug use disorder    No results found. Physical Exam:     /66 (BP Location: Left arm, Patient Position: Sitting, Cuff Size: Standard)   Pulse 69   Temp 97.5 °F (36.4 °C) (Temporal)   Resp 18   Wt 70.5 kg (155 lb 6.4 oz)   SpO2 98%   BMI 28.89 kg/m²     Physical Exam  HENT:      Head: Normocephalic. Right Ear: External ear normal.      Left Ear: External ear normal.   Eyes:      Conjunctiva/sclera: Conjunctivae normal.   Cardiovascular:      Rate and Rhythm: Normal rate and regular rhythm. Pulmonary:      Effort: Pulmonary effort is normal.      Breath sounds: Normal breath sounds. Abdominal:      General: Bowel sounds are normal.      Palpations: Abdomen is soft. Musculoskeletal:      Right lower leg: No edema. Left lower leg: No edema. Neurological:      Mental Status: She is alert and oriented to person, place, and time.    Psychiatric:         Mood and Affect: Mood normal.         Behavior: Behavior normal.          Rich Tejeda,

## 2023-09-12 ENCOUNTER — APPOINTMENT (OUTPATIENT)
Dept: LAB | Facility: HOSPITAL | Age: 73
End: 2023-09-12
Payer: COMMERCIAL

## 2023-09-12 DIAGNOSIS — Z00.00 ENCOUNTER FOR SUBSEQUENT ANNUAL WELLNESS VISIT (AWV) IN MEDICARE PATIENT: ICD-10-CM

## 2023-09-12 LAB
ALBUMIN SERPL BCP-MCNC: 4.4 G/DL (ref 3.5–5)
ALP SERPL-CCNC: 48 U/L (ref 34–104)
ALT SERPL W P-5'-P-CCNC: 24 U/L (ref 7–52)
ANION GAP SERPL CALCULATED.3IONS-SCNC: 5 MMOL/L
AST SERPL W P-5'-P-CCNC: 27 U/L (ref 13–39)
BASOPHILS # BLD AUTO: 0.04 THOUSANDS/ÂΜL (ref 0–0.1)
BASOPHILS NFR BLD AUTO: 1 % (ref 0–1)
BILIRUB SERPL-MCNC: 0.94 MG/DL (ref 0.2–1)
BUN SERPL-MCNC: 14 MG/DL (ref 5–25)
CALCIUM SERPL-MCNC: 9.6 MG/DL (ref 8.4–10.2)
CHLORIDE SERPL-SCNC: 99 MMOL/L (ref 96–108)
CO2 SERPL-SCNC: 28 MMOL/L (ref 21–32)
CREAT SERPL-MCNC: 0.56 MG/DL (ref 0.6–1.3)
EOSINOPHIL # BLD AUTO: 0.11 THOUSAND/ÂΜL (ref 0–0.61)
EOSINOPHIL NFR BLD AUTO: 3 % (ref 0–6)
ERYTHROCYTE [DISTWIDTH] IN BLOOD BY AUTOMATED COUNT: 13.1 % (ref 11.6–15.1)
GFR SERPL CREATININE-BSD FRML MDRD: 94 ML/MIN/1.73SQ M
GLUCOSE P FAST SERPL-MCNC: 94 MG/DL (ref 65–99)
HCT VFR BLD AUTO: 43.1 % (ref 34.8–46.1)
HGB BLD-MCNC: 14.9 G/DL (ref 11.5–15.4)
IMM GRANULOCYTES # BLD AUTO: 0.01 THOUSAND/UL (ref 0–0.2)
IMM GRANULOCYTES NFR BLD AUTO: 0 % (ref 0–2)
LYMPHOCYTES # BLD AUTO: 1.12 THOUSANDS/ÂΜL (ref 0.6–4.47)
LYMPHOCYTES NFR BLD AUTO: 32 % (ref 14–44)
MCH RBC QN AUTO: 34.4 PG (ref 26.8–34.3)
MCHC RBC AUTO-ENTMCNC: 34.6 G/DL (ref 31.4–37.4)
MCV RBC AUTO: 100 FL (ref 82–98)
MONOCYTES # BLD AUTO: 0.34 THOUSAND/ÂΜL (ref 0.17–1.22)
MONOCYTES NFR BLD AUTO: 10 % (ref 4–12)
NEUTROPHILS # BLD AUTO: 1.87 THOUSANDS/ÂΜL (ref 1.85–7.62)
NEUTS SEG NFR BLD AUTO: 54 % (ref 43–75)
NRBC BLD AUTO-RTO: 0 /100 WBCS
PLATELET # BLD AUTO: 189 THOUSANDS/UL (ref 149–390)
PMV BLD AUTO: 10.9 FL (ref 8.9–12.7)
POTASSIUM SERPL-SCNC: 4.5 MMOL/L (ref 3.5–5.3)
PROT SERPL-MCNC: 7.3 G/DL (ref 6.4–8.4)
RBC # BLD AUTO: 4.33 MILLION/UL (ref 3.81–5.12)
SODIUM SERPL-SCNC: 132 MMOL/L (ref 135–147)
WBC # BLD AUTO: 3.49 THOUSAND/UL (ref 4.31–10.16)

## 2023-09-12 PROCEDURE — 36415 COLL VENOUS BLD VENIPUNCTURE: CPT

## 2023-09-12 PROCEDURE — 85025 COMPLETE CBC W/AUTO DIFF WBC: CPT

## 2023-09-12 PROCEDURE — 80053 COMPREHEN METABOLIC PANEL: CPT

## 2023-09-22 ENCOUNTER — TELEPHONE (OUTPATIENT)
Age: 73
End: 2023-09-22

## 2023-09-22 DIAGNOSIS — D75.89 MACROCYTOSIS WITHOUT ANEMIA: Primary | ICD-10-CM

## 2023-09-22 NOTE — TELEPHONE ENCOUNTER
----- Message from José Manuel Krishnan DO sent at 9/22/2023  3:32 PM EDT -----  Overall blood work is stable. Sodium level still a little low. I recommend increase salt in the diet. Cbc shows your white count is still low. I recommend follow up with the hematologist oncologist. Alisa Sales last saw them in October of 2021.

## 2023-09-25 NOTE — TELEPHONE ENCOUNTER
Spoke with pt, she mentions MD she saw in 2021 isn't practicing within [facility]. Recommended speciality at [facility]. Ref pended.

## 2023-09-28 ENCOUNTER — TELEPHONE (OUTPATIENT)
Dept: HEMATOLOGY ONCOLOGY | Facility: CLINIC | Age: 73
End: 2023-09-28

## 2023-09-28 NOTE — TELEPHONE ENCOUNTER
I called Tam Arthur in response to a referral that was received for patient to establish care with Hematology. Outreach was made to schedule a consultation. Patient's voicemail is full and unable to accept messages at this time. Another attempt will be made to contact patient. Patient was last seen by Dr Jossy Mcclendon 1 year ago for the same dx.

## 2023-09-29 ENCOUNTER — TELEPHONE (OUTPATIENT)
Dept: HEMATOLOGY ONCOLOGY | Facility: CLINIC | Age: 73
End: 2023-09-29

## 2023-09-29 NOTE — TELEPHONE ENCOUNTER
I called Reny Price in response to a referral that was received for patient to establish care with Hematology. Outreach was made to schedule a consultation. Patient's voicemail is full and unable to accept messages at this time. Another attempt will be made to contact patient.

## 2023-10-02 ENCOUNTER — TELEPHONE (OUTPATIENT)
Dept: HEMATOLOGY ONCOLOGY | Facility: CLINIC | Age: 73
End: 2023-10-02

## 2023-10-02 NOTE — TELEPHONE ENCOUNTER
I called Aracely Beverly in response to a referral that was received for patient to establish care with Hematology. Outreach was made to schedule a consultation. Patient's voicemail is full and unable to accept messages at this time. This is the third attempt to schedule patient unsuccessfully. The referral has been closed, a [employer] message has been sent to patient (if applicable) and a letter has been sent to the address on file.

## 2023-11-11 PROBLEM — M71.21 BAKER'S CYST, RIGHT: Status: ACTIVE | Noted: 2023-11-11

## 2023-11-13 ENCOUNTER — OFFICE VISIT (OUTPATIENT)
Dept: HEMATOLOGY ONCOLOGY | Facility: CLINIC | Age: 73
End: 2023-11-13
Payer: COMMERCIAL

## 2023-11-13 VITALS
SYSTOLIC BLOOD PRESSURE: 116 MMHG | TEMPERATURE: 97.6 F | DIASTOLIC BLOOD PRESSURE: 76 MMHG | WEIGHT: 161 LBS | RESPIRATION RATE: 16 BRPM | OXYGEN SATURATION: 98 % | BODY MASS INDEX: 29.63 KG/M2 | HEIGHT: 62 IN | HEART RATE: 68 BPM

## 2023-11-13 DIAGNOSIS — D75.89 MACROCYTOSIS WITHOUT ANEMIA: ICD-10-CM

## 2023-11-13 DIAGNOSIS — D70.9 NEUTROPENIA, UNSPECIFIED TYPE (HCC): Primary | ICD-10-CM

## 2023-11-13 PROCEDURE — 99214 OFFICE O/P EST MOD 30 MIN: CPT | Performed by: INTERNAL MEDICINE

## 2023-11-13 NOTE — PROGRESS NOTES
[address] HEMATOLOGY ONCOLOGY SPECIALISTS [facility]  [facility] [birth year]  1950      PRIMARY HEMATOLOGIC/ONCOLOGIC DIAGNOSIS:  Macrocytosis  Leukopenia      INTERIM HISTORY:  The patient presents for a follow-up. No recurrent infections. Does not take any medications. PAST MEDICAL,PAST SURGICAL, FAMILY AND SOCIAL HISTORY:    Patient Active Problem List   Diagnosis    Macrocytosis without anemia    Primary generalized (osteo)arthritis    Fibromyalgia    Lumbar spondylosis    Primary osteoarthritis of both knees    Plantar fasciitis    Chronic venous insufficiency    Senile osteoporosis    Vitamin D insufficiency    Chronic leukopenia    Baker's cyst, right     Past Medical History:   Diagnosis Date    Disease of thyroid gland     Fibromyalgia, primary     Fractures     Hyperlipemia     Lumbar spondylosis     Osteoarthritis     Venous insufficiency     Vertigo      Past Surgical History:   Procedure Laterality Date    EYE SURGERY Bilateral      Family History   Problem Relation Age of Onset    Cancer Mother     Arthritis Mother     Cancer Father     Kidney cancer Sister     Diabetes Sister     Heart attack Brother      Social History     Socioeconomic History    Marital status:      Spouse name: Not on file    Number of children: Not on file    Years of education: Not on file    Highest education level: Not on file   Occupational History    Not on file   Tobacco Use    Smoking status: Never    Smokeless tobacco: Never   Vaping Use    Vaping Use: Never used   Substance and Sexual Activity    Alcohol use:  Yes     Alcohol/week: 8.0 standard drinks of alcohol     Types: 8 Glasses of wine per week    Drug use: No    Sexual activity: Not on file   Other Topics Concern    Not on file   Social History Narrative    Not on file     Social Determinants of Health     Financial Resource Strain: Medium Risk (9/6/2023)    Overall Financial Resource Strain (CARDIA)     Difficulty of Paying Living Expenses: Somewhat hard   Food Insecurity: Not on file   Transportation Needs: No Transportation Needs (9/6/2023)    PRAPARE - Transportation     Lack of Transportation (Medical): No     Lack of Transportation (Non-Medical): No   Physical Activity: Not on file   Stress: No Stress Concern Present (1/24/2023)    [facility]     Feeling of Stress : Only a little   Social Connections: Not on file   Intimate Partner Violence: Not on file   Housing Stability: Not on file       Current Outpatient Medications:     Cholecalciferol (VITAMIN D3) 529967 UNIT/GM POWD, Take by mouth Daily, Disp: , Rfl:     Coconut Oil 1000 MG CAPS, daily, Disp: , Rfl:     Flaxseed, Linseed, (FLAXSEED OIL) 1000 MG CAPS, daily, Disp: , Rfl:     Milk Thistle 1000 MG CAPS, daily, Disp: , Rfl:     Coenzyme Q10 (COQ-10) 10 MG CAPS, daily (Patient not taking: Reported on 9/6/2023), Disp: , Rfl:     Glucosamine-Chondroitin 2711-9335 MG/30ML LIQD, Take by mouth (Patient not taking: Reported on 9/6/2023), Disp: , Rfl:   Allergies   Allergen Reactions    Codeine GI Intolerance    Prednisone Other (See Comments)     Shaking and hallucinations     Vitals:    11/13/23 1450   Resp: 16       ROS:  CONSTITUTIONAL:  No fever. No chills. No dizziness. No weakness. EYES:  No pain, erythema, or discharge. No blurring of vision. ENT:  No sore throat, URI symptoms. No epistaxis. No tinnitus. CARDIOVASCULAR:  No chest pain. No palpitations. No lower extremity edema. RESPIRATORY:  No shortness of breath, cough, pain with respiration, pleuritic chest pain. No hemoptysis. No dyspnea. No paroxysmal nocturnal dyspnea. GASTROINTESTINAL:  Normal appetite. No nausea, vomiting, diarrhea. No pain. No bloating. No melena. GENITOURINARY:  No frequency, urgency, nocturia. No hematuria or dysuria. MUSCULOSKELETAL:  No arthralgias or myalgias.   INTEGUMENTARY:  No swelling. No bruising. No contusions. No abrasions. No lymphangitis. NEUROLOGIC:  No headache. No neck pain. No numbness or tingling of the extremities. No weakness. PSYCHIATRIC:  No confusion. ENDOCRINE:  No fatigue. No weakness. No history of thyroid, diabetes or adrenal problems. HEMATOLOGICAL:  No bleeding. No petechiae. No bruising.     PHYSICAL EXAM:    GENERAL: AAO x 3  HEENT: AT,NC  CVS: S1S2 RRR  LUNGS: CTA b/l  ABD: NT,ND, +BS  EXTR: no edema  NEURO: CN II-XII grossly intact    LABS:  I have reviewed pertinent labs:  CBC:   Lab Results   Component Value Date    WBC 3.49 (L) 09/12/2023    RBC 4.33 09/12/2023    HGB 14.9 09/12/2023    HCT 43.1 09/12/2023     (H) 09/12/2023     09/12/2023    MCH 34.4 (H) 09/12/2023    MCHC 34.6 09/12/2023    RDW 13.1 09/12/2023    MPV 10.9 09/12/2023    NEUTROABS 1.87 09/12/2023     CMP:   Lab Results   Component Value Date    SODIUM 132 (L) 09/12/2023    K 4.5 09/12/2023    CL 99 09/12/2023    CO2 28 09/12/2023    AGAP 5 09/12/2023    BUN 14 09/12/2023    CREATININE 0.56 (L) 09/12/2023    GLUC 100 03/28/2017    GLUF 94 09/12/2023    CALCIUM 9.6 09/12/2023    AST 27 09/12/2023    ALT 24 09/12/2023    ALKPHOS 48 09/12/2023    TP 7.3 09/12/2023    ALB 4.4 09/12/2023    TBILI 0.94 09/12/2023    EGFR 94 09/12/2023     Liver Enzymes:   Lab Results   Component Value Date    AST 27 09/12/2023    ALT 24 09/12/2023    ALKPHOS 48 09/12/2023    TP 7.3 09/12/2023    ALB 4.4 09/12/2023    TBILI 0.94 09/12/2023    BILIDIR 0.24 (H) 03/28/2017     Vitamin B12   Lab Results   Component Value Date    IOETBHLX30 745 11/03/2020     Iron Study   Lab Results   Component Value Date    RETIC 82,000 11/23/2020    RETICCTPCT 2.06 (H) 11/23/2020    FERRITIN 80 11/03/2020     Folate   Lab Results   Component Value Date    FOLATE 19.3 (H) 11/03/2020     Magnesium No results found for: "MG"  Phosphorus No results found for: "PHOS"  Coagulation Panel No results found for: "DDIMER", "PROTIME", "INR", "PTT"    IMAGING:  No results found. I reviewed the above laboratory and imaging data. ASSESSMENT/PLAN:  Macrocytosis and leukopenia. Etiology unclear. Initial work-up will include flow cytometry. LDH, haptoglobin, B12/folate/copper, KIM. Check US liver/spleen. F/u in 6-8 weeks.

## 2023-11-14 PROBLEM — M48.061 NEURAL FORAMINAL STENOSIS OF LUMBAR SPINE: Status: ACTIVE | Noted: 2023-11-14

## 2023-11-14 PROBLEM — Z79.899 ENCOUNTER FOR LONG-TERM (CURRENT) USE OF OTHER MEDICATIONS: Status: ACTIVE | Noted: 2023-11-14

## 2023-11-27 ENCOUNTER — HOSPITAL ENCOUNTER (OUTPATIENT)
Dept: ULTRASOUND IMAGING | Facility: HOSPITAL | Age: 73
Discharge: HOME | End: 2023-11-27
Attending: INTERNAL MEDICINE
Payer: COMMERCIAL

## 2023-11-27 DIAGNOSIS — D75.89 MACROCYTOSIS WITHOUT ANEMIA: ICD-10-CM

## 2023-11-27 DIAGNOSIS — D70.9 NEUTROPENIA, UNSPECIFIED TYPE (HCC): ICD-10-CM

## 2023-11-27 PROCEDURE — 76700 US EXAM ABDOM COMPLETE: CPT

## 2023-12-08 ENCOUNTER — VBI (OUTPATIENT)
Dept: ADMINISTRATIVE | Facility: OTHER | Age: 73
End: 2023-12-08

## 2023-12-21 ENCOUNTER — TELEPHONE (OUTPATIENT)
Dept: HEMATOLOGY ONCOLOGY | Facility: CLINIC | Age: 73
End: 2023-12-21

## 2024-01-03 ENCOUNTER — HOSPITAL ENCOUNTER (OUTPATIENT)
Dept: RADIOLOGY | Facility: HOSPITAL | Age: 74
Discharge: HOME | End: 2024-01-03
Payer: COMMERCIAL

## 2024-01-03 ENCOUNTER — APPOINTMENT (OUTPATIENT)
Dept: LAB | Facility: HOSPITAL | Age: 74
End: 2024-01-03
Attending: INTERNAL MEDICINE
Payer: COMMERCIAL

## 2024-01-03 DIAGNOSIS — D75.89 MACROCYTOSIS WITHOUT ANEMIA: ICD-10-CM

## 2024-01-03 DIAGNOSIS — D70.9 NEUTROPENIA, UNSPECIFIED TYPE (HCC): ICD-10-CM

## 2024-01-03 DIAGNOSIS — M17.0 PRIMARY OSTEOARTHRITIS OF BOTH KNEES: ICD-10-CM

## 2024-01-03 LAB
ALBUMIN SERPL BCP-MCNC: 4.3 G/DL (ref 3.5–5)
ALP SERPL-CCNC: 57 U/L (ref 34–104)
ALT SERPL W P-5'-P-CCNC: 22 U/L (ref 7–52)
ANION GAP SERPL CALCULATED.3IONS-SCNC: 5 MMOL/L
AST SERPL W P-5'-P-CCNC: 25 U/L (ref 13–39)
BASOPHILS # BLD AUTO: 0.06 THOUSANDS/ÂΜL (ref 0–0.1)
BASOPHILS NFR BLD AUTO: 2 % (ref 0–1)
BILIRUB SERPL-MCNC: 0.53 MG/DL (ref 0.2–1)
BUN SERPL-MCNC: 15 MG/DL (ref 5–25)
CALCIUM SERPL-MCNC: 9.6 MG/DL (ref 8.4–10.2)
CHLORIDE SERPL-SCNC: 99 MMOL/L (ref 96–108)
CO2 SERPL-SCNC: 27 MMOL/L (ref 21–32)
CREAT SERPL-MCNC: 0.56 MG/DL (ref 0.6–1.3)
EOSINOPHIL # BLD AUTO: 0.08 THOUSAND/ÂΜL (ref 0–0.61)
EOSINOPHIL NFR BLD AUTO: 2 % (ref 0–6)
ERYTHROCYTE [DISTWIDTH] IN BLOOD BY AUTOMATED COUNT: 13.2 % (ref 11.6–15.1)
FOLATE SERPL-MCNC: 19.8 NG/ML
GFR SERPL CREATININE-BSD FRML MDRD: 94 ML/MIN/1.73SQ M
GLUCOSE P FAST SERPL-MCNC: 96 MG/DL (ref 65–99)
HCT VFR BLD AUTO: 40.2 % (ref 34.8–46.1)
HGB BLD-MCNC: 14.3 G/DL (ref 11.5–15.4)
IMM GRANULOCYTES # BLD AUTO: 0.01 THOUSAND/UL (ref 0–0.2)
IMM GRANULOCYTES NFR BLD AUTO: 0 % (ref 0–2)
LDH SERPL-CCNC: 174 U/L (ref 140–271)
LYMPHOCYTES # BLD AUTO: 0.96 THOUSANDS/ÂΜL (ref 0.6–4.47)
LYMPHOCYTES NFR BLD AUTO: 25 % (ref 14–44)
MCH RBC QN AUTO: 35.2 PG (ref 26.8–34.3)
MCHC RBC AUTO-ENTMCNC: 35.6 G/DL (ref 31.4–37.4)
MCV RBC AUTO: 99 FL (ref 82–98)
MONOCYTES # BLD AUTO: 0.43 THOUSAND/ÂΜL (ref 0.17–1.22)
MONOCYTES NFR BLD AUTO: 11 % (ref 4–12)
NEUTROPHILS # BLD AUTO: 2.35 THOUSANDS/ÂΜL (ref 1.85–7.62)
NEUTS SEG NFR BLD AUTO: 60 % (ref 43–75)
NRBC BLD AUTO-RTO: 0 /100 WBCS
PLATELET # BLD AUTO: 184 THOUSANDS/UL (ref 149–390)
PMV BLD AUTO: 11.3 FL (ref 8.9–12.7)
POTASSIUM SERPL-SCNC: 4.7 MMOL/L (ref 3.5–5.3)
PROT SERPL-MCNC: 6.9 G/DL (ref 6.4–8.4)
RBC # BLD AUTO: 4.06 MILLION/UL (ref 3.81–5.12)
RETICS # AUTO: ABNORMAL 10*3/UL (ref 14097–95744)
RETICS # CALC: 2.09 % (ref 0.37–1.87)
SODIUM SERPL-SCNC: 131 MMOL/L (ref 135–147)
VIT B12 SERPL-MCNC: 925 PG/ML (ref 180–914)
WBC # BLD AUTO: 3.89 THOUSAND/UL (ref 4.31–10.16)

## 2024-01-03 PROCEDURE — 80053 COMPREHEN METABOLIC PANEL: CPT

## 2024-01-03 PROCEDURE — 83615 LACTATE (LD) (LDH) ENZYME: CPT

## 2024-01-03 PROCEDURE — 82607 VITAMIN B-12: CPT

## 2024-01-03 PROCEDURE — 85045 AUTOMATED RETICULOCYTE COUNT: CPT

## 2024-01-03 PROCEDURE — 36415 COLL VENOUS BLD VENIPUNCTURE: CPT

## 2024-01-03 PROCEDURE — 82746 ASSAY OF FOLIC ACID SERUM: CPT

## 2024-01-03 PROCEDURE — 85025 COMPLETE CBC W/AUTO DIFF WBC: CPT

## 2024-01-03 PROCEDURE — 82525 ASSAY OF COPPER: CPT

## 2024-01-03 PROCEDURE — 83010 ASSAY OF HAPTOGLOBIN QUANT: CPT

## 2024-01-03 PROCEDURE — 88185 FLOWCYTOMETRY/TC ADD-ON: CPT

## 2024-01-03 PROCEDURE — 73562 X-RAY EXAM OF KNEE 3: CPT

## 2024-01-03 PROCEDURE — 86038 ANTINUCLEAR ANTIBODIES: CPT

## 2024-01-03 PROCEDURE — 88184 FLOWCYTOMETRY/ TC 1 MARKER: CPT

## 2024-01-04 LAB
ANA SER QL IA: NEGATIVE
HAPTOGLOB SERPL-MCNC: 35 MG/DL (ref 42–346)

## 2024-01-08 ENCOUNTER — TELEPHONE (OUTPATIENT)
Dept: HEMATOLOGY ONCOLOGY | Facility: CLINIC | Age: 74
End: 2024-01-08

## 2024-01-08 LAB
COPPER SERPL-MCNC: 87 UG/DL (ref 80–158)
SCAN RESULT: NORMAL

## 2024-01-08 NOTE — TELEPHONE ENCOUNTER
Jose Luis Farnsworth , patient has an appt on 2/12 @10;30 am with Yolanda and she wants to come in later   That day . Can you help

## 2024-01-10 ENCOUNTER — TELEPHONE (OUTPATIENT)
Dept: HEMATOLOGY ONCOLOGY | Facility: CLINIC | Age: 74
End: 2024-01-10

## 2024-02-09 NOTE — PROGRESS NOTES
[facility] HEMATOLOGY ONCOLOGY SPECIALISTS [place]  [address] [facility] [id number]  Hematology Ambulatory Follow-Up  Alissa Bryant, 1950, [id number]  2/9/2024      Assessment and Plan   1. Reticulocytosis  2. Leukopenia, unspecified type  3. Chronic fatigue  4. Other abnormality of red blood cells  - Hemolysis Smear; Future  - Direct antiglobulin test; Future  - Peripheral Smear; Future  - TSH, 3rd generation with Free T4 reflex; Future  - Iron Panel (Includes Ferritin, Iron Sat%, Iron, and TIBC); Future  - CBC and differential; Future     Leukopenia   Patient is a 73-year-old female with history of osteoarthritis who presents as follow-up.  Patient was initially seen by hematology in 2020 for macrocytosis which has been present since at least 2017.  At this time patient did not have constitutional symptoms and she was referred back to her PCP for observation.  She has now been referred back to our clinic for the development of leukopenia which has been present since 2022 -> WBC 3.89 with increased and relative basophils otherwise normal differential.  Absolute basophil count 0.06.  Patient was previously seen by Dr. Hooper, underwent extensive workup for leukopenia.  No nutritional deficiencies were identified.  Leukemia/lymphoma panel and KIM screen were normal.     2. Macrocytosis   Mild. Present since 2017. Likely the patients baseline.     Discussion/plan:  For her leukopenia, previous work up was unrevealing. Patient denies constitutional symptoms.  Leukopenia may be secondary to chronic inflammation in setting of osteoarthritis, chronic pain and fibromyalgia.  We discussed the possibility of obtaining bone marrow biopsy for further evaluation to r/o hematological malignancy.  Patient is in favor of continued observation at this time.   In regards to macrocytosis.  Patient has mild reticulocytosis with low haptoglobin.  Will obtain direct Fede test and hemolysis  smear, rule out AIHA. Also Check TSH as it has not been done previously.  Continue CBCD 3 every 3 months  If leukopenia were to worsen or if she develops other cytopenias/constitutional symptoms, would recommend bone marrow biopsy.    The patient is scheduled for follow-up in approximately 3m  Patient voiced agreement and understanding to the above.   Patient advised to call the Hematology/Oncology office with any questions and concerns regarding the above.    Barrier(s) to care: None  The patient is able to self care.    Yolanda Vang PA-C   Medical Oncology/Hematology  [facility]    Subjective   No chief complaint on file.      History of present illness: 73-year-old female with past medical history of arthritis, spondylosis, fibromyalgia, osteoporosis who was initially seen in consultation in 2020 for macrocytic RBC macrocytosis, reticulocytosis.  On chart review, macrocytosis has been present since at least 2017.  At this time, patient did not have any constitutional symptoms.  She was referred back to her PCP for continued observation.  Patient has been referred back to our office for evaluation of leukopenia in September 2023.  Labs at this time revealed WBC 3.49 with normal differential, hemoglobin 14.9, , platelets 189,000.    Limited data points on chart review however it appears that the leukopenia has been present since September 2022.  Patient denies history of chronic infections such as hepatitis C or HIV.  Her previous hep C workup was negative.  She has been been going to rheumatologist for 25 years for osteoarthritis, lumbar spondylosis, and fibromyalgia. Not on medication for OA.  Currently only taking turmeric, calcium/vitamin D and Aleve prn.  Patient denies history of bariatric surgery or malabsorption disorder.  No known liver disease or splenomegaly.  Patient denies constitutional symptoms such as unexplained fever, night sweats, unintentional weight loss  or lymphadenopathy.  No abdominal pain, changes in appetite, hematochezia or melena.    Never smoker. Has 1- 2 glasses of wine per day. Work as a .     No personal history of cancer. Father has history of prostate cancer.  Mother had history of lymphoma.    01/2024: WBC 3.89, hgb 14.3, MCV 99, plat 184k. haptoglobin 35. Retic 2.09%. . B12 925. Folate 19.8. Copper normal. KIM neg. Flow cytometry negative. Na 131, otherwise BMP unremarkable.     Interval history: Patient denies acute complaints.  No constitutional symptoms.  Has chronic pain related to arthritis.    Review of Systems   Constitutional:  Positive for fatigue. Negative for appetite change, chills, diaphoresis, fever and unexpected weight change.   Respiratory:  Negative for cough and shortness of breath.    Cardiovascular:  Negative for chest pain.   Gastrointestinal:  Negative for abdominal pain, blood in stool, constipation and diarrhea.   Genitourinary:  Negative for hematuria and vaginal bleeding.   Musculoskeletal:  Positive for arthralgias and back pain.   Neurological:  Positive for headaches (occasional). Negative for dizziness and light-headedness.   Hematological:  Negative for adenopathy. Does not bruise/bleed easily.       Patient Active Problem List   Diagnosis    Macrocytosis without anemia    Primary generalized (osteo)arthritis    Fibromyalgia    Lumbar spondylosis    Primary osteoarthritis of both knees    Plantar fasciitis    Chronic venous insufficiency    Senile osteoporosis    Vitamin D insufficiency    Chronic leukopenia    Baker's cyst, right    Encounter for long-term (current) use of other medications    Neural foraminal stenosis of lumbar spine     Past Medical History:   Diagnosis Date    Disease of thyroid gland     Fibromyalgia, primary     Fractures     Hyperlipemia     Lumbar spondylosis     Osteoarthritis     Venous insufficiency     Vertigo      Past Surgical History:   Procedure Laterality Date    EYE  SURGERY Bilateral      Family History   Problem Relation Age of Onset    Cancer Mother     Arthritis Mother     Cancer Father     Kidney cancer Sister     Diabetes Sister     Heart attack Brother      Social History     Socioeconomic History    Marital status:      Spouse name: Not on file    Number of children: Not on file    Years of education: Not on file    Highest education level: Not on file   Occupational History    Not on file   Tobacco Use    Smoking status: Never    Smokeless tobacco: Never   Vaping Use    Vaping status: Never Used   Substance and Sexual Activity    Alcohol use: Yes     Alcohol/week: 8.0 standard drinks of alcohol     Types: 8 Glasses of wine per week    Drug use: No    Sexual activity: Not on file   Other Topics Concern    Not on file   Social History Narrative    Not on file     Social Determinants of Health     Financial Resource Strain: Medium Risk (9/6/2023)    Overall Financial Resource Strain (CARDIA)     Difficulty of Paying Living Expenses: Somewhat hard   Food Insecurity: Not on file   Transportation Needs: No Transportation Needs (9/6/2023)    PRAPARE - Transportation     Lack of Transportation (Medical): No     Lack of Transportation (Non-Medical): No   Physical Activity: Not on file   Stress: No Stress Concern Present (1/24/2023)    [employer] [place] [employer] - Occupational Stress Questionnaire     Feeling of Stress : Only a little   Social Connections: Not on file   Intimate Partner Violence: Not on file   Housing Stability: Not on file       Current Outpatient Medications:     Cholecalciferol (VITAMIN D3) 166391 UNIT/GM POWD, Take by mouth Daily, Disp: , Rfl:     Coconut Oil 1000 MG CAPS, daily, Disp: , Rfl:     Coenzyme Q10 (COQ-10) 10 MG CAPS, , Disp: , Rfl:     Flaxseed, Linseed, (FLAXSEED OIL) 1000 MG CAPS, daily, Disp: , Rfl:     Glucosamine-Chondroitin 6662-8336 MG/30ML LIQD, Take by mouth, Disp: , Rfl:     Milk Thistle 1000 MG CAPS, daily, Disp:  , Rfl:     TART CHERRY PO, Take by mouth, Disp: , Rfl:     vitamin E 100 UNIT capsule, Take 100 Units by mouth daily, Disp: , Rfl:   Allergies   Allergen Reactions    Codeine GI Intolerance    Prednisone Other (See Comments)     Shaking and hallucinations       Objective   There were no vitals taken for this visit.   Physical Exam  Vitals reviewed.   HENT:      Head: Normocephalic.   Eyes:      Extraocular Movements: Extraocular movements intact.      Pupils: Pupils are equal, round, and reactive to light.   Cardiovascular:      Rate and Rhythm: Normal rate and regular rhythm.      Heart sounds: Normal heart sounds.   Pulmonary:      Effort: Pulmonary effort is normal.      Breath sounds: Normal breath sounds.   Abdominal:      Palpations: Abdomen is soft.      Tenderness: There is no abdominal tenderness.   Musculoskeletal:      Cervical back: Neck supple.   Lymphadenopathy:      Cervical: No cervical adenopathy.   Skin:     Findings: No rash.   Neurological:      Mental Status: She is alert.         Result Review  Labs:  No visits with results within 30 Day(s) from this visit.   Latest known visit with results is:   Appointment on 01/03/2024   Component Date Value Ref Range Status    IKM 01/03/2024 Negative  Negative Final    Haptoglobin 01/03/2024 35 (L)  42 - 346 mg/dL Final    LD 01/03/2024 174  140 - 271 U/L Final    Vitamin B-12 01/03/2024 925 (H)  180 - 914 pg/mL Final    Folate 01/03/2024 19.8  >5.9 ng/mL Final    The World Health Organization has determined deficient folate concentrations are considered to be <4.0 ng/mL.    Copper 01/03/2024 87  80 - 158 ug/dL Final                                    Detection Limit = 5    Retic Ct Abs 01/03/2024 84,900  14,097 - 95,744 Final    Retic Ct Pct 01/03/2024 2.09 (H)  0.37 - 1.87 % Final    WBC 01/03/2024 3.89 (L)  4.31 - 10.16 Thousand/uL Final    RBC 01/03/2024 4.06  3.81 - 5.12 Million/uL Final    Hemoglobin 01/03/2024 14.3  11.5 - 15.4 g/dL Final     Hematocrit 01/03/2024 40.2  34.8 - 46.1 % Final    MCV 01/03/2024 99 (H)  82 - 98 fL Final    MCH 01/03/2024 35.2 (H)  26.8 - 34.3 pg Final    MCHC 01/03/2024 35.6  31.4 - 37.4 g/dL Final    RDW 01/03/2024 13.2  11.6 - 15.1 % Final    MPV 01/03/2024 11.3  8.9 - 12.7 fL Final    Platelets 01/03/2024 184  149 - 390 Thousands/uL Final    nRBC 01/03/2024 0  /100 WBCs Final    Neutrophils Relative 01/03/2024 60  43 - 75 % Final    Immat GRANS % 01/03/2024 0  0 - 2 % Final    Lymphocytes Relative 01/03/2024 25  14 - 44 % Final    Monocytes Relative 01/03/2024 11  4 - 12 % Final    Eosinophils Relative 01/03/2024 2  0 - 6 % Final    Basophils Relative 01/03/2024 2 (H)  0 - 1 % Final    Neutrophils Absolute 01/03/2024 2.35  1.85 - 7.62 Thousands/µL Final    Immature Grans Absolute 01/03/2024 0.01  0.00 - 0.20 Thousand/uL Final    Lymphocytes Absolute 01/03/2024 0.96  0.60 - 4.47 Thousands/µL Final    Monocytes Absolute 01/03/2024 0.43  0.17 - 1.22 Thousand/µL Final    Eosinophils Absolute 01/03/2024 0.08  0.00 - 0.61 Thousand/µL Final    Basophils Absolute 01/03/2024 0.06  0.00 - 0.10 Thousands/µL Final    Sodium 01/03/2024 131 (L)  135 - 147 mmol/L Final    Potassium 01/03/2024 4.7  3.5 - 5.3 mmol/L Final    Chloride 01/03/2024 99  96 - 108 mmol/L Final    CO2 01/03/2024 27  21 - 32 mmol/L Final    ANION GAP 01/03/2024 5  mmol/L Final    BUN 01/03/2024 15  5 - 25 mg/dL Final    Creatinine 01/03/2024 0.56 (L)  0.60 - 1.30 mg/dL Final    Standardized to IDMS reference method    Glucose, Fasting 01/03/2024 96  65 - 99 mg/dL Final    Calcium 01/03/2024 9.6  8.4 - 10.2 mg/dL Final    AST 01/03/2024 25  13 - 39 U/L Final    ALT 01/03/2024 22  7 - 52 U/L Final    Specimen collection should occur prior to Sulfasalazine administration due to the potential for falsely depressed results.     Alkaline Phosphatase 01/03/2024 57  34 - 104 U/L Final    Total Protein 01/03/2024 6.9  6.4 - 8.4 g/dL Final    Albumin 01/03/2024 4.3  3.5 -  5.0 g/dL Final    Total Bilirubin 01/03/2024 0.53  0.20 - 1.00 mg/dL Final    Use of this assay is not recommended for patients undergoing treatment with eltrombopag due to the potential for falsely elevated results.  N-acetyl-p-benzoquinone imine (metabolite of Acetaminophen) will generate erroneously low results in samples for patients that have taken an overdose of Acetaminophen.    eGFR 01/03/2024 94  ml/min/1.73sq m Final    Scan Result 01/03/2024 SEE WRITTEN REPORT   Final       Imaging:   I reviewed relevant imaging    Please note:  This report has been generated by a voice recognition software system. Therefore there may be syntax, spelling, and/or grammatical errors. Please call if you have any questions.

## 2024-02-12 ENCOUNTER — OFFICE VISIT (OUTPATIENT)
Dept: HEMATOLOGY ONCOLOGY | Facility: CLINIC | Age: 74
End: 2024-02-12
Payer: COMMERCIAL

## 2024-02-12 VITALS
BODY MASS INDEX: 30.02 KG/M2 | RESPIRATION RATE: 16 BRPM | HEIGHT: 61 IN | TEMPERATURE: 98.2 F | DIASTOLIC BLOOD PRESSURE: 70 MMHG | SYSTOLIC BLOOD PRESSURE: 138 MMHG | OXYGEN SATURATION: 95 % | WEIGHT: 159 LBS | HEART RATE: 52 BPM

## 2024-02-12 DIAGNOSIS — D72.819 LEUKOPENIA, UNSPECIFIED TYPE: ICD-10-CM

## 2024-02-12 DIAGNOSIS — R53.82 CHRONIC FATIGUE: ICD-10-CM

## 2024-02-12 DIAGNOSIS — R70.1 RETICULOCYTOSIS: Primary | ICD-10-CM

## 2024-02-12 DIAGNOSIS — R71.8 OTHER ABNORMALITY OF RED BLOOD CELLS: ICD-10-CM

## 2024-02-12 PROCEDURE — 99213 OFFICE O/P EST LOW 20 MIN: CPT | Performed by: PHYSICIAN ASSISTANT

## 2024-02-20 ENCOUNTER — APPOINTMENT (OUTPATIENT)
Dept: LAB | Facility: HOSPITAL | Age: 74
End: 2024-02-20
Payer: COMMERCIAL

## 2024-02-20 DIAGNOSIS — R53.82 CHRONIC FATIGUE: ICD-10-CM

## 2024-02-20 DIAGNOSIS — R71.8 OTHER ABNORMALITY OF RED BLOOD CELLS: ICD-10-CM

## 2024-02-20 DIAGNOSIS — R70.1 RETICULOCYTOSIS: ICD-10-CM

## 2024-02-20 DIAGNOSIS — D72.819 LEUKOPENIA, UNSPECIFIED TYPE: ICD-10-CM

## 2024-02-20 LAB
BASOPHILS NFR BLD MANUAL: 2 % (ref 0–1)
BLD SMEAR INTERP: NORMAL
DAT POLY-SP REAG RBC QL: NEGATIVE
FERRITIN SERPL-MCNC: 38 NG/ML (ref 11–307)
IRON SATN MFR SERPL: 43 % (ref 15–50)
IRON SERPL-MCNC: 149 UG/DL (ref 50–212)
LG PLATELETS BLD QL SMEAR: PRESENT
LYMPHOCYTES NFR BLD: 30 % (ref 14–44)
MONOCYTES NFR BLD AUTO: 4 % (ref 4–12)
NEUTS SEG NFR BLD AUTO: 62 % (ref 45–77)
PLATELET BLD QL SMEAR: ADEQUATE
RBC MORPH BLD: NORMAL
TIBC SERPL-MCNC: 350 UG/DL (ref 250–450)
TOTAL CELLS COUNTED SPEC: 100
TSH SERPL DL<=0.05 MIU/L-ACNC: 1.74 UIU/ML (ref 0.45–4.5)
UIBC SERPL-MCNC: 201 UG/DL (ref 155–355)
VARIANT LYMPHS BLD QL SMEAR: 2 % (ref 0–0)

## 2024-02-20 PROCEDURE — 82728 ASSAY OF FERRITIN: CPT

## 2024-02-20 PROCEDURE — 36415 COLL VENOUS BLD VENIPUNCTURE: CPT

## 2024-02-20 PROCEDURE — 84443 ASSAY THYROID STIM HORMONE: CPT

## 2024-02-20 PROCEDURE — 83540 ASSAY OF IRON: CPT

## 2024-02-20 PROCEDURE — 86880 COOMBS TEST DIRECT: CPT

## 2024-02-20 PROCEDURE — 85007 BL SMEAR W/DIFF WBC COUNT: CPT

## 2024-02-20 PROCEDURE — 83550 IRON BINDING TEST: CPT

## 2024-04-22 ENCOUNTER — TELEPHONE (OUTPATIENT)
Dept: HEMATOLOGY ONCOLOGY | Facility: CLINIC | Age: 74
End: 2024-04-22

## 2024-04-22 ENCOUNTER — TELEPHONE (OUTPATIENT)
Age: 74
End: 2024-04-22

## 2024-04-22 NOTE — TELEPHONE ENCOUNTER
Appointment Change  Cancel, Reschedule, Change to Virtual      Who are you speaking with? Patient   If it is not the patient, is the caller listed on the communication consent form? N/A   Which provider is the appointment scheduled with? Yolanda Vang    When was the original appointment scheduled?    Please list date and time 5/15/24 @ 2:30pm   At which location is the appointment scheduled to take place? [place]   Was the appointment rescheduled?     Was the appointment changed from an in person visit to a virtual visit?    If so, please list the details of the change. Yes 6/25/24 @ 12:30pm   What is the reason for the appointment change? Yolanda will be out of the office       Was STAR transport scheduled? no   Does STAR transport need to be scheduled for the new visit (if applicable) no   Does the patient need an infusion appointment rescheduled? no   Does the patient have an upcoming infusion appointment scheduled? If so, when? no   Is the patient undergoing chemotherapy? no   For appointments cancelled with less than 24 hours:  Was the no-show policy reviewed? yes

## 2024-04-30 ENCOUNTER — CLINICAL SUPPORT (OUTPATIENT)
Age: 74
End: 2024-04-30
Payer: COMMERCIAL

## 2024-04-30 VITALS
SYSTOLIC BLOOD PRESSURE: 130 MMHG | TEMPERATURE: 97.4 F | BODY MASS INDEX: 29.66 KG/M2 | WEIGHT: 157 LBS | HEART RATE: 63 BPM | DIASTOLIC BLOOD PRESSURE: 80 MMHG | OXYGEN SATURATION: 97 %

## 2024-04-30 DIAGNOSIS — M17.11 PRIMARY OSTEOARTHRITIS OF RIGHT KNEE: Primary | ICD-10-CM

## 2024-04-30 PROCEDURE — 20610 DRAIN/INJ JOINT/BURSA W/O US: CPT | Performed by: INTERNAL MEDICINE

## 2024-04-30 RX ORDER — HYALURONATE SODIUM 10 MG/ML
20 SYRINGE (ML) INTRAARTICULAR
Status: COMPLETED | OUTPATIENT
Start: 2024-04-30 | End: 2024-04-30

## 2024-04-30 RX ADMIN — Medication 20 MG: at 14:40

## 2024-04-30 NOTE — PROGRESS NOTES
Large joint arthrocentesis: R knee  Universal Protocol:  Consent: Verbal consent obtained.  Risks and benefits: risks, benefits and alternatives were discussed  Consent given by: patient  Timeout called at: 4/30/2024 4:37 PM.  Patient understanding: patient states understanding of the procedure being performed  Patient consent: the patient's understanding of the procedure matches consent given  Procedure consent: procedure consent matches procedure scheduled  Relevant documents: relevant documents present and verified  Test results: test results available and properly labeled  Site marked: the operative site was marked  Radiology Images displayed and confirmed. If images not available, report reviewed: imaging studies available  Patient identity confirmed: verbally with patient  Supporting Documentation  Indications: pain   Procedure Details  Location: knee - R knee  Preparation: Patient was prepped and draped in the usual sterile fashion  Needle size: 20 G  Ultrasound guidance: no  Approach: medial  Medications administered: 20 mg Sodium Hyaluronate (Viscosup) 20 MG/2ML    Patient tolerance: patient tolerated the procedure well with no immediate complications  Dressing:  Sterile dressing applied     Told to ice 4 times a day for 4 days.

## 2024-05-01 ENCOUNTER — VBI (OUTPATIENT)
Dept: ADMINISTRATIVE | Facility: OTHER | Age: 74
End: 2024-05-01

## 2024-05-14 ENCOUNTER — OFFICE VISIT (OUTPATIENT)
Age: 74
End: 2024-05-14

## 2024-05-14 VITALS
OXYGEN SATURATION: 99 % | DIASTOLIC BLOOD PRESSURE: 72 MMHG | HEIGHT: 62 IN | WEIGHT: 161.3 LBS | HEART RATE: 64 BPM | TEMPERATURE: 97.3 F | BODY MASS INDEX: 29.68 KG/M2 | SYSTOLIC BLOOD PRESSURE: 118 MMHG

## 2024-05-14 DIAGNOSIS — M47.816 LUMBAR SPONDYLOSIS: ICD-10-CM

## 2024-05-14 DIAGNOSIS — M17.0 PRIMARY OSTEOARTHRITIS OF BOTH KNEES: ICD-10-CM

## 2024-05-14 DIAGNOSIS — M79.7 FIBROMYALGIA: ICD-10-CM

## 2024-05-14 DIAGNOSIS — D72.819 CHRONIC LEUKOPENIA: ICD-10-CM

## 2024-05-14 DIAGNOSIS — M81.0 SENILE OSTEOPOROSIS: ICD-10-CM

## 2024-05-14 DIAGNOSIS — M17.11 PRIMARY OSTEOARTHRITIS OF RIGHT KNEE: ICD-10-CM

## 2024-05-14 DIAGNOSIS — I87.2 CHRONIC VENOUS INSUFFICIENCY: ICD-10-CM

## 2024-05-14 DIAGNOSIS — M15.0 PRIMARY GENERALIZED (OSTEO)ARTHRITIS: Primary | ICD-10-CM

## 2024-05-14 RX ADMIN — Medication 20 MG: at 13:20

## 2024-05-14 NOTE — PROGRESS NOTES
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  She has not gone for the MRI of her lumbar spine ordered at the last visit as overall she feels stable to improved.  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  No evidence for DVT on venous duplex.  She does note improvement in her leg symptoms with compression hose.  She is currently having another Euflexxa series right knee.  Follow-up 6 months or sooner if needed.  Encouraged home exercise program as tolerated..    Lumbar spondylosis  Neurogenic symptoms with prolonged standing at work which responds to sitting.  Right calf pain improved with compression hose for venous disease if back symptoms worsen, will reorder lumbar spine MRI.  She did not feel her symptoms were significant enough to go for lumbar spine MRI ordered at the last visit.  Encouraged home exercises as tolerated and stretching.  Continue to monitor.  Follow-up 6 months or sooner if needed.    Chronic leukopenia  Chronic leukopenia likely clinically insignificant.  No history of recurrent fevers or recurrent infections.  Continue to monitor.    Fibromyalgia  Fibromyalgia generally stable with home exercise program with stretching as tolerated.  Continue arthritis strength Tylenol 2 tabs every 12 hours as needed.  She is not interested in trial of prescription medication such as gabapentin or Lyrica at this time.  Continue to monitor.    Primary osteoarthritis of both knees  Patient currently receiving Euflexxa series right knee.  She has had benefit with this in the past.  X-rays bilateral knees dated 1/3/2024 significant for bilateral tricompartmental disease.  She is most symptomatic in her right knee.  Continue to monitor her response to Euflexxa.  Continue home exercise program as tolerated.  Continue compression hose for probable venous  insufficiency.      Senile osteoporosis  DEXA dated 6/12/2023 with normal bone density and low FRAX risk for fracture.  She does have history of left clavicular fracture and 2 rib fractures posttraumatic treated conservatively in 2020.  No need for prescription medication at this time.  Encourage calcium and vitamin D supplement.  Monitor BMD in 2 years.  She will be due in June 2025.  Continue to monitor.    Chronic venous insufficiency  Chronic venous insufficiency bilateral lower extremities.  She did have evaluation with vascular surgery and will consider sclerotherapy if she has return of significant pain.  Improvement in right calf pain and swelling with compression hose.  Continue to monitor.         Problem List Items Addressed This Visit       Primary generalized (osteo)arthritis - Primary     Primary generalized osteoarthritis with lumbar spondylosis with neurogenic symptoms.  She has not gone for the MRI of her lumbar spine ordered at the last visit as overall she feels stable to improved.  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  No evidence for DVT on venous duplex.  She does note improvement in her leg symptoms with compression hose.  She is currently having another Euflexxa series right knee.  Follow-up 6 months or sooner if needed.  Encouraged home exercise program as tolerated..         Fibromyalgia     Fibromyalgia generally stable with home exercise program with stretching as tolerated.  Continue arthritis strength Tylenol 2 tabs every 12 hours as needed.  She is not interested in trial of prescription medication such as gabapentin or Lyrica at this time.  Continue to monitor.         Lumbar spondylosis     Neurogenic symptoms with prolonged standing at work which responds to sitting.  Right calf pain improved with compression hose for venous disease if back symptoms worsen, will reorder lumbar spine MRI.  She did not feel her symptoms  were significant enough to go for lumbar spine MRI ordered at the last visit.  Encouraged home exercises as tolerated and stretching.  Continue to monitor.  Follow-up 6 months or sooner if needed.         Primary osteoarthritis of both knees     Patient currently receiving Euflexxa series right knee.  She has had benefit with this in the past.  X-rays bilateral knees dated 1/3/2024 significant for bilateral tricompartmental disease.  She is most symptomatic in her right knee.  Continue to monitor her response to Euflexxa.  Continue home exercise program as tolerated.  Continue compression hose for probable venous insufficiency.           Chronic venous insufficiency     Chronic venous insufficiency bilateral lower extremities.  She did have evaluation with vascular surgery and will consider sclerotherapy if she has return of significant pain.  Improvement in right calf pain and swelling with compression hose.  Continue to monitor.         Senile osteoporosis     DEXA dated 6/12/2023 with normal bone density and low FRAX risk for fracture.  She does have history of left clavicular fracture and 2 rib fractures posttraumatic treated conservatively in 2020.  No need for prescription medication at this time.  Encourage calcium and vitamin D supplement.  Monitor BMD in 2 years.  She will be due in June 2025.  Continue to monitor.         Chronic leukopenia     Chronic leukopenia likely clinically insignificant.  No history of recurrent fevers or recurrent infections.  Continue to monitor.          Other Visit Diagnoses       Primary osteoarthritis of right knee        Relevant Orders    Large joint arthrocentesis: R knee                Reviewed records, labs, and imaging with the patient in detail.  Counseled patient.  Discussion regarding my findings and recommendations.  Office visit with documentation 25 min.    Subjective:      Patient ID: Alissa Bryant is a 74 y.o. female.    Patient primary generalized  osteoarthritis, with lumbar spondylosis with neurogenic symptoms consistent with stenosis, and diffuse DJD.  History DJD bilateral knees right greater than left, with overall benefit with prior right knee Euflexxa series completed in October.  She is currently getting another Euflexxa series. She still has soreness in the calf with swelling at the end of a long working day.  Suspect this is secondary to her Baker's cyst. History of macrocytosis without anemia.  She has seen Hematology who is just monitoring.  Chronic leukopenia stable.  Note patient had mildly elevated reticulocyte count which the hematologist did not feel was clinically significant.  History traumatic left clavicle and rib fractures.  Myofascial syndrome with symptoms weather related.  History left shoulder arthralgias left rotator cuff tear/infraspinatus partial tear and history right biceps rupture secondary to trauma.  Venous insufficiency ankles and feet with spider veins and varicose veins.  She was evaluated by vascular surgery who felt the symptoms with pain were consistent with either superficial phlebitis or stasis dermatitis. She is wearing venous compression hose daily.  History plantar fasciitis intermittently active.    Lab work dated 2/20/2024 significant for iron 149.  Ferritin 38.  Hemolysis smear negative for schistocytes or helmet cells.  YVONNE negative.  TSH 1.744.  Lab work dated 1/3/2024 significant for flow cytometry negative.  Retake count 2.09 slightly increased.   normal.  Haptoglobin low at 35.  KIM negative.  White blood cell count 3.89 with MCV mildly elevated at 99.  Platelet count 184.  Vitamin B12 925 slightly high.  Creatinine 0.56 with estimated GFR 94.  Sodium 131.  Calcium 9.6.  Folate 19.8.  Review of lab work dated 9/12/2023 significant for white blood cell count 3.49 with absolute neutrophil count 1.87.  .  MCHC elevated at 34.4.  Platelets 189.  Sodium 132.  Calcium 9.6.  Estimated GFR 94.  Lab  work dated 6/14/2023 significant for white blood cell count 3.96 with absolute neutrophil count 2.24.  Macrocytosis noted.  Vitamin D 42.1.  CRP 1.6.  Sodium 132.  Calcium 9.0.  Estimated GFR 94.  Sed rate less than 1.  CRP 1.6.  Review of lab work dated 09/07/2022 significant for white blood cell count 4.12 with absolute neutrophil count 1.98.   stable.  Platelets 210.  Hepatitis-C antibody negative.  Creatinine 0.53 with estimated GFR 96.  Fasting blood sugar 101.  Calcium 8.8.  Total cholesterol 206.  Triglycerides 42.  .  LDL 92.  Lipid profile and glucose improved.    DEXA dated 6/12/2023 significant for L1 and L4 T-score +0.6.  Left total hip -0.7 with femoral neck +0.2.  Left distal forearm -0.8.  Low FRAX risk for fracture.    X-ray bilateral knees dated 1/3/2024 significant for tricompartmental DJD bilaterally.        Allergies  Allergies   Allergen Reactions    Codeine GI Intolerance    Prednisone Other (See Comments)     Shaking and hallucinations       Home Medications    Current Outpatient Medications:     Cholecalciferol (VITAMIN D3) 554008 UNIT/GM POWD, Take by mouth Daily, Disp: , Rfl:     Coconut Oil 1000 MG CAPS, daily, Disp: , Rfl:     Coenzyme Q10 (COQ-10) 10 MG CAPS, , Disp: , Rfl:     Flaxseed, Linseed, (FLAXSEED OIL) 1000 MG CAPS, daily, Disp: , Rfl:     Glucosamine-Chondroitin 2281-2697 MG/30ML LIQD, Take by mouth, Disp: , Rfl:     Milk Thistle 1000 MG CAPS, daily, Disp: , Rfl:     TART CHERRY PO, Take by mouth, Disp: , Rfl:     vitamin E 100 UNIT capsule, Take 100 Units by mouth daily, Disp: , Rfl:     Past Medical History  Past Medical History:   Diagnosis Date    Disease of thyroid gland     Fibromyalgia, primary     Fractures     Hyperlipemia     Lumbar spondylosis     Osteoarthritis     Venous insufficiency     Vertigo        Past Surgical History   Past Surgical History:   Procedure Laterality Date    EYE SURGERY Bilateral        Family History   Family History   Problem  Relation Age of Onset    Cancer Mother     Arthritis Mother     Cancer Father     Kidney cancer Sister     Diabetes Sister     Heart attack Brother        The following portions of the patient's history were reviewed and updated as appropriate: allergies, current medications, past family history, past medical history, past social history, past surgical history, and problem list.    Review of Systems   Constitutional:  Negative for chills and fever.   HENT:  Negative for ear pain and sore throat.    Eyes:  Negative for pain and visual disturbance.   Respiratory:  Negative for cough and shortness of breath.    Cardiovascular:  Positive for leg swelling. Negative for chest pain and palpitations.        VV's LE's with spider veins. History burning sensations along PTT resolved. Trace edema.   Gastrointestinal:  Negative for abdominal pain and vomiting.   Genitourinary:  Negative for dysuria and hematuria.   Musculoskeletal:  Positive for arthralgias, back pain and neck stiffness.        No significant morning stiffness.  She is generally worse at the end of the day.  She had complaints of burning in the ankles medial aspect along the posterior tibial tendon which resolved. She has significant varicosities and spider veins and is using compression hose for this. Right calf pain and swelling with prolonged standing.  She has been taking tylenol and occas ASA for pain. Knee pain right greater than left, with improvement with Euflexxa series right knee.  She is currently getting another Euflexxa series for her right knee.  No Raynaud's.  No sicca symptoms.  No current plantar fasciitis.  Neurogenic symptoms with prolonged standing.  She notes neck stiffness.  Sleep is interrupted frequently at night with early wakening. Hx clavicle fracture and 2 rib fractures 7/2020 with injury in garden with rolling forward and continuing to roll.   Skin:  Negative for color change and rash.   Neurological:  Positive for dizziness.  "Negative for seizures and syncope.        Vertigo stable   All other systems reviewed and are negative.        Objective:      /72 (BP Location: Left arm, Patient Position: Sitting, Cuff Size: Adult) Comment: Obtained by CO  Pulse 64 Comment: Obtained by CO  Temp (!) 97.3 °F (36.3 °C) (Temporal) Comment: Obtained by CO  Ht 5' 2.3\" (1.582 m) Comment: Obtained by CO  Wt 73.2 kg (161 lb 4.8 oz) Comment: Obtained by CO  SpO2 99% Comment: Obtained by CO  BMI 29.22 kg/m²          Physical Exam  Vitals reviewed.   Constitutional:       Appearance: Normal appearance.   HENT:      Head: Normocephalic.      Nose:      Comments: Nose and throat unremarkable.  Eyes:      Extraocular Movements: Extraocular movements intact.   Neck:      Comments: Without masses, thyromegaly, lymphadenopathy  Cardiovascular:      Rate and Rhythm: Regular rhythm.   Pulmonary:      Breath sounds: Normal breath sounds.   Abdominal:      Palpations: Abdomen is soft.   Musculoskeletal:      Cervical back: Neck supple.      Comments: Neck slightly decreased lateral flexion with spasm posterior cervical and shoulder girdle muscles.  Shoulders right slight decreased external rotation.  Left full range of motion.  Elbows and wrists full range of motion.  Tinel's negative bilaterally.  Hands squaring 1st carpometacarpal joints bilaterally with Heberden's Vicki's nodes.  No synovitis noted.  Z thumb deformities noted bilaterally.  Straight leg raising negative bilaterally.  Spasm noted in the lower lumbosacral paraspinals.  Hips full range of motion.  Knees slightly decreased flexion right with small cool effusions right>left appreciated.  Fullness popliteal fossa right greater than left.  Probable right Baker's cyst.  No palpable cords noted.  Negative Homans.  Bilateral patellofemoral crepitus.  Ankles full range of motion.  Feet rearfoot hyperpronation with hallux valgus deformities and early hammertoe deformities.  No synovitis noted. "   Skin:     General: Skin is warm.      Comments: Marked spider veins lower extremities particularly medial aspect ankles in addition to varicosities. Stasis changes left medial ankle.  Homans negative, but there is slight  increased calf size in the right lower extremity as compared to the left.  No ulcerations noted.   Neurological:      General: No focal deficit present.           Large joint arthrocentesis: R knee  Universal Protocol:  Consent: Verbal consent obtained.  Risks and benefits: risks, benefits and alternatives were discussed  Consent given by: patient  Timeout called at: 5/14/2024 3:10 PM.  Patient understanding: patient states understanding of the procedure being performed  Patient consent: the patient's understanding of the procedure matches consent given  Procedure consent: procedure consent matches procedure scheduled  Relevant documents: relevant documents present and verified  Test results: test results available and properly labeled  Site marked: the operative site was marked  Radiology Images displayed and confirmed. If images not available, report reviewed: imaging studies available  Patient identity confirmed: verbally with patient  Supporting Documentation  Indications: pain   Procedure Details  Location: knee - R knee  Preparation: Patient was prepped and draped in the usual sterile fashion  Needle size: 20 G  Ultrasound guidance: no  Approach: medial  Medications administered: 20 mg Sodium Hyaluronate (Viscosup) 20 MG/2ML    Patient tolerance: patient tolerated the procedure well with no immediate complications  Dressing:  Sterile dressing applied     Told to ice 4 times a day for 4 days.            This note was written in part using the assistance of the [employer] [id number] microphone system. Those portions using this system have been dictated and not read.

## 2024-05-14 NOTE — PATIENT INSTRUCTIONS
Continue compression hose on a daily basis.  We will tentatively plan to repeat the Euflexxa series every 6 months since you are getting benefit from it.  Follow-up with hematology as scheduled.

## 2024-05-20 ENCOUNTER — VBI (OUTPATIENT)
Dept: ADMINISTRATIVE | Facility: OTHER | Age: 74
End: 2024-05-20

## 2024-05-20 NOTE — TELEPHONE ENCOUNTER
05/20/24 3:45 PM     [employer] SmartForm used to document caregap status.    Stella Youngblood MA

## 2024-05-21 ENCOUNTER — CLINICAL SUPPORT (OUTPATIENT)
Age: 74
End: 2024-05-21
Payer: COMMERCIAL

## 2024-05-21 VITALS
BODY MASS INDEX: 28.87 KG/M2 | DIASTOLIC BLOOD PRESSURE: 76 MMHG | HEART RATE: 65 BPM | TEMPERATURE: 96.8 F | SYSTOLIC BLOOD PRESSURE: 124 MMHG | WEIGHT: 159.4 LBS | OXYGEN SATURATION: 100 %

## 2024-05-21 DIAGNOSIS — M17.11 PRIMARY OSTEOARTHRITIS OF RIGHT KNEE: Primary | ICD-10-CM

## 2024-05-21 PROCEDURE — 20610 DRAIN/INJ JOINT/BURSA W/O US: CPT | Performed by: INTERNAL MEDICINE

## 2024-05-21 RX ADMIN — Medication 20 MG: at 12:00

## 2024-05-21 NOTE — PROGRESS NOTES
Large joint arthrocentesis: R knee  Universal Protocol:  Consent: Verbal consent obtained.  Risks and benefits: risks, benefits and alternatives were discussed  Consent given by: patient  Timeout called at: 5/21/2024 12:42 PM.  Patient understanding: patient states understanding of the procedure being performed  Patient consent: the patient's understanding of the procedure matches consent given  Procedure consent: procedure consent matches procedure scheduled  Relevant documents: relevant documents present and verified  Test results: test results available and properly labeled  Site marked: the operative site was marked  Radiology Images displayed and confirmed. If images not available, report reviewed: imaging studies available  Patient identity confirmed: verbally with patient  Supporting Documentation  Indications: pain   Procedure Details  Location: knee - R knee  Preparation: Patient was prepped and draped in the usual sterile fashion  Needle size: 20 G  Ultrasound guidance: no  Approach: medial  Medications administered: 20 mg Sodium Hyaluronate (Viscosup) 20 MG/2ML    Patient tolerance: patient tolerated the procedure well with no immediate complications  Dressing:  Sterile dressing applied     Told to ice 4 times a day for 4 days.

## 2024-06-03 RX ORDER — HYALURONATE SODIUM 10 MG/ML
20 SYRINGE (ML) INTRAARTICULAR
Status: COMPLETED | OUTPATIENT
Start: 2024-05-21 | End: 2024-05-21

## 2024-06-21 ENCOUNTER — TELEPHONE (OUTPATIENT)
Dept: HEMATOLOGY ONCOLOGY | Facility: CLINIC | Age: 74
End: 2024-06-21

## 2024-06-22 ENCOUNTER — APPOINTMENT (OUTPATIENT)
Dept: LAB | Facility: HOSPITAL | Age: 74
End: 2024-06-22
Payer: COMMERCIAL

## 2024-06-22 DIAGNOSIS — D72.819 LEUKOPENIA, UNSPECIFIED TYPE: ICD-10-CM

## 2024-06-22 LAB
BASOPHILS # BLD AUTO: 0.07 THOUSANDS/ÂΜL (ref 0–0.1)
BASOPHILS NFR BLD AUTO: 2 % (ref 0–1)
EOSINOPHIL # BLD AUTO: 0.13 THOUSAND/ÂΜL (ref 0–0.61)
EOSINOPHIL NFR BLD AUTO: 4 % (ref 0–6)
ERYTHROCYTE [DISTWIDTH] IN BLOOD BY AUTOMATED COUNT: 13.9 % (ref 11.6–15.1)
HCT VFR BLD AUTO: 35.8 % (ref 34.8–46.1)
HGB BLD-MCNC: 12.1 G/DL (ref 11.5–15.4)
IMM GRANULOCYTES # BLD AUTO: 0.02 THOUSAND/UL (ref 0–0.2)
IMM GRANULOCYTES NFR BLD AUTO: 1 % (ref 0–2)
LYMPHOCYTES # BLD AUTO: 1.02 THOUSANDS/ÂΜL (ref 0.6–4.47)
LYMPHOCYTES NFR BLD AUTO: 28 % (ref 14–44)
MCH RBC QN AUTO: 34.7 PG (ref 26.8–34.3)
MCHC RBC AUTO-ENTMCNC: 33.8 G/DL (ref 31.4–37.4)
MCV RBC AUTO: 103 FL (ref 82–98)
MONOCYTES # BLD AUTO: 0.43 THOUSAND/ÂΜL (ref 0.17–1.22)
MONOCYTES NFR BLD AUTO: 12 % (ref 4–12)
NEUTROPHILS # BLD AUTO: 1.98 THOUSANDS/ÂΜL (ref 1.85–7.62)
NEUTS SEG NFR BLD AUTO: 53 % (ref 43–75)
NRBC BLD AUTO-RTO: 0 /100 WBCS
PLATELET # BLD AUTO: 192 THOUSANDS/UL (ref 149–390)
PMV BLD AUTO: 10.8 FL (ref 8.9–12.7)
RBC # BLD AUTO: 3.49 MILLION/UL (ref 3.81–5.12)
WBC # BLD AUTO: 3.65 THOUSAND/UL (ref 4.31–10.16)

## 2024-06-22 PROCEDURE — 36415 COLL VENOUS BLD VENIPUNCTURE: CPT

## 2024-06-22 PROCEDURE — 85025 COMPLETE CBC W/AUTO DIFF WBC: CPT

## 2024-06-25 ENCOUNTER — OFFICE VISIT (OUTPATIENT)
Dept: HEMATOLOGY ONCOLOGY | Facility: CLINIC | Age: 74
End: 2024-06-25
Payer: COMMERCIAL

## 2024-06-25 VITALS
DIASTOLIC BLOOD PRESSURE: 66 MMHG | TEMPERATURE: 97.9 F | SYSTOLIC BLOOD PRESSURE: 122 MMHG | BODY MASS INDEX: 29.26 KG/M2 | WEIGHT: 159 LBS | HEIGHT: 62 IN | RESPIRATION RATE: 16 BRPM | HEART RATE: 69 BPM | OXYGEN SATURATION: 97 %

## 2024-06-25 DIAGNOSIS — R70.1 RETICULOCYTOSIS: Primary | ICD-10-CM

## 2024-06-25 DIAGNOSIS — D72.819 LEUKOPENIA, UNSPECIFIED TYPE: ICD-10-CM

## 2024-06-25 PROCEDURE — 99213 OFFICE O/P EST LOW 20 MIN: CPT | Performed by: PHYSICIAN ASSISTANT

## 2024-06-25 NOTE — PROGRESS NOTES
[facility] HEMATOLOGY ONCOLOGY SPECIALISTS [address] [address]  Hematology Ambulatory Follow-Up  Alissa Bryant, 1950, [id number]  6/25/2024      Assessment and Plan   Patient is a 74-year-old female with history of osteoarthritis who presents as follow-up.  Patient was initially seen by hematology in 2020 for macrocytosis which has been present since at least 2017. At this time patient did not have constitutional symptoms and she was referred back to her PCP for observation.  She has now been referred back to our clinic for the development of leukopenia.     Leukopenia   present since 2022. WBC differential is completely normal.  Patient underwent extensive workup for leukopenia including evaluation of nutritional deficiencies, KIM screen, leukemia lymphoma flow cytometry which were unrevealing.   - CBC and differential; Future  - Peripheral Smear; Future    2. Macrocytosis   MCV . Present since 2017. Likely the patients baseline.  She has a mild reticulocytosis and low haptoglobin with normal LDH and Fede test.  She denies any bleeding.  Has never had colonoscopy.  She gets Cologuard through PCP.   - Osmotic fragility, RBC; Future    Discussion/Plan  Will obtain osmotic fragility test with her next set of blood work   Slight decrease in her hgb noted from baseline (12 from 14g/dL).  For persistent leukopenia, we again discussed pursuing bone marrow biopsy versus continued observation.  Patient is in favor of the latter as she is clinically asymptomatic. She will reconsider in the future if she develops any worsening leukopenia, cytopenias, constitutional symptoms or frequent infections.    Declines colonoscopy.  Continue Cologuard through PCP.  Repeat CBC in 2 weeks. If stable will continue to monitor CBCD every 3m.   RTC 6m     Patient voiced agreement and understanding to the above.   Patient advised to call the Hematology/Oncology office with  any questions and concerns regarding the above.    Barrier(s) to care: None  The patient is able to self care.    Yolanda Vang PA-C   Medical Oncology/Hematology  [facility]    Subjective   No chief complaint on file.      History of present illness: 74-year-old female with past medical history of arthritis, spondylosis, fibromyalgia, osteoporosis who was initially seen in consultation in 2020 for macrocytic RBC macrocytosis, reticulocytosis.  On chart review, macrocytosis has been present since at least 2017.  At this time, patient did not have any constitutional symptoms.  She was referred back to her PCP for continued observation.  Patient has been referred back to our office for evaluation of leukopenia in September 2023.  Labs at this time revealed WBC 3.49 with normal differential, hemoglobin 14.9, , platelets 189,000.     Limited data points on chart review however it appears that the leukopenia has been present since September 2022.  Patient denies history of chronic infections such as hepatitis C or HIV.  Her previous hep C workup was negative.  She has been been going to rheumatologist for 25 years for osteoarthritis, lumbar spondylosis, and fibromyalgia. Not on medication for OA.  Currently only taking turmeric, calcium/vitamin D and Aleve prn.  Patient denies history of bariatric surgery or malabsorption disorder.  No known liver disease or splenomegaly.  Patient denies constitutional symptoms such as unexplained fever, night sweats, unintentional weight loss or lymphadenopathy.  No abdominal pain, changes in appetite, hematochezia or melena.     Never smoker. Has 1- 2 glasses of wine per day. Work as a .      No personal history of cancer. Father has history of prostate cancer.  Mother had history of lymphoma.     01/2024: WBC 3.89, hgb 14.3, MCV 99, plat 184k. haptoglobin 35. Retic 2.09%. . B12 925. Folate 19.8. Copper normal. KIM neg. Flow  cytometry negative. Na 131, otherwise BMP unremarkable.      06/2024: WBC 3.65, hemoglobin 12.1, , platelets 192,000.  Increase in relative basophilia 2% with normal absolute basophil count.  WBC differential otherwise normal.     06/25/24 :  Lab Results   Component Value Date    IRON 149 02/20/2024    TIBC 350 02/20/2024    FERRITIN 38 02/20/2024     Lab Results   Component Value Date    WBC 3.65 (L) 06/22/2024    HGB 12.1 06/22/2024    HCT 35.8 06/22/2024     (H) 06/22/2024     06/22/2024       Interval history: She is feeling well.  Denies acute complaints.  She has gained a few extra pounds recently and is now eliminating carbohydrates from her diet.  She denies fever, night sweats, unintentional weight loss, lymphadenopathy or significant fatigue.  She is only limited by her arthritis.  Denies hematochezia, melena or hematuria.  No vaginal bleeding.    Review of Systems   All other systems reviewed and are negative.      Patient Active Problem List   Diagnosis    Macrocytosis without anemia    Primary generalized (osteo)arthritis    Fibromyalgia    Lumbar spondylosis    Primary osteoarthritis of both knees    Plantar fasciitis    Chronic venous insufficiency    Senile osteoporosis    Vitamin D insufficiency    Chronic leukopenia    Baker's cyst, right    Encounter for long-term (current) use of other medications    Neural foraminal stenosis of lumbar spine     Past Medical History:   Diagnosis Date    Disease of thyroid gland     Fibromyalgia, primary     Fractures     Hyperlipemia     Lumbar spondylosis     Osteoarthritis     Venous insufficiency     Vertigo      Past Surgical History:   Procedure Laterality Date    EYE SURGERY Bilateral      Family History   Problem Relation Age of Onset    Cancer Mother     Arthritis Mother     Cancer Father     Kidney cancer Sister     Diabetes Sister     Heart attack Brother      Social History     Socioeconomic History    Marital status:       Spouse name: Not on file    Number of children: Not on file    Years of education: Not on file    Highest education level: Not on file   Occupational History    Not on file   Tobacco Use    Smoking status: Never    Smokeless tobacco: Never   Vaping Use    Vaping status: Never Used   Substance and Sexual Activity    Alcohol use: Yes     Alcohol/week: 8.0 standard drinks of alcohol     Types: 8 Glasses of wine per week    Drug use: No    Sexual activity: Not on file   Other Topics Concern    Not on file   Social History Narrative    Not on file     Social Determinants of Health     Financial Resource Strain: Medium Risk (9/6/2023)    Overall Financial Resource Strain (CARDIA)     Difficulty of Paying Living Expenses: Somewhat hard   Food Insecurity: Not on file   Transportation Needs: No Transportation Needs (9/6/2023)    PRAPARE - Transportation     Lack of Transportation (Medical): No     Lack of Transportation (Non-Medical): No   Physical Activity: Not on file   Stress: No Stress Concern Present (1/24/2023)    [place] [employer] - Occupational Stress Questionnaire     Feeling of Stress : Only a little   Social Connections: Unknown (6/18/2024)    Received from [employer]    Social Ryla     How often do you feel lonely or isolated from those around you? (Adult - for ages 18 years and over): Not on file   Intimate Partner Violence: Not on file   Housing Stability: Not on file       Current Outpatient Medications:     Cholecalciferol (VITAMIN D3) 298084 UNIT/GM POWD, Take by mouth Daily, Disp: , Rfl:     Coconut Oil 1000 MG CAPS, daily, Disp: , Rfl:     Coenzyme Q10 (COQ-10) 10 MG CAPS, , Disp: , Rfl:     Flaxseed, Linseed, (FLAXSEED OIL) 1000 MG CAPS, daily, Disp: , Rfl:     Glucosamine-Chondroitin 0544-7801 MG/30ML LIQD, Take by mouth, Disp: , Rfl:     Milk Thistle 1000 MG CAPS, daily, Disp: , Rfl:     TART CHERRY PO, Take by mouth, Disp: , Rfl:     vitamin E 100 UNIT capsule, Take 100  Units by mouth daily, Disp: , Rfl:   Allergies   Allergen Reactions    Codeine GI Intolerance    Prednisone Other (See Comments)     Shaking and hallucinations       Objective   There were no vitals taken for this visit.   Physical Exam    Result Review  Labs:  Appointment on 06/22/2024   Component Date Value Ref Range Status    WBC 06/22/2024 3.65 (L)  4.31 - 10.16 Thousand/uL Final    RBC 06/22/2024 3.49 (L)  3.81 - 5.12 Million/uL Final    Hemoglobin 06/22/2024 12.1  11.5 - 15.4 g/dL Final    Hematocrit 06/22/2024 35.8  34.8 - 46.1 % Final    MCV 06/22/2024 103 (H)  82 - 98 fL Final    MCH 06/22/2024 34.7 (H)  26.8 - 34.3 pg Final    MCHC 06/22/2024 33.8  31.4 - 37.4 g/dL Final    RDW 06/22/2024 13.9  11.6 - 15.1 % Final    MPV 06/22/2024 10.8  8.9 - 12.7 fL Final    Platelets 06/22/2024 192  149 - 390 Thousands/uL Final    nRBC 06/22/2024 0  /100 WBCs Final    Segmented % 06/22/2024 53  43 - 75 % Final    Immature Grans % 06/22/2024 1  0 - 2 % Final    Lymphocytes % 06/22/2024 28  14 - 44 % Final    Monocytes % 06/22/2024 12  4 - 12 % Final    Eosinophils Relative 06/22/2024 4  0 - 6 % Final    Basophils Relative 06/22/2024 2 (H)  0 - 1 % Final    Absolute Neutrophils 06/22/2024 1.98  1.85 - 7.62 Thousands/µL Final    Absolute Immature Grans 06/22/2024 0.02  0.00 - 0.20 Thousand/uL Final    Absolute Lymphocytes 06/22/2024 1.02  0.60 - 4.47 Thousands/µL Final    Absolute Monocytes 06/22/2024 0.43  0.17 - 1.22 Thousand/µL Final    Eosinophils Absolute 06/22/2024 0.13  0.00 - 0.61 Thousand/µL Final    Basophils Absolute 06/22/2024 0.07  0.00 - 0.10 Thousands/µL Final       Imaging:   I reviewed relevant imaging    Please note:  This report has been generated by a voice recognition software system. Therefore there may be syntax, spelling, and/or grammatical errors. Please call if you have any questions.

## 2024-08-11 RX ORDER — HYALURONATE SODIUM 10 MG/ML
20 SYRINGE (ML) INTRAARTICULAR
Status: COMPLETED | OUTPATIENT
Start: 2024-05-14 | End: 2024-05-14

## 2024-08-12 NOTE — ASSESSMENT & PLAN NOTE
Neurogenic symptoms with prolonged standing at work which responds to sitting.  Right calf pain improved with compression hose for venous disease if back symptoms worsen, will reorder lumbar spine MRI.  She did not feel her symptoms were significant enough to go for lumbar spine MRI ordered at the last visit.  Encouraged home exercises as tolerated and stretching.  Continue to monitor.  Follow-up 6 months or sooner if needed.

## 2024-08-12 NOTE — ASSESSMENT & PLAN NOTE
Chronic leukopenia likely clinically insignificant.  No history of recurrent fevers or recurrent infections.  Continue to monitor.

## 2024-08-12 NOTE — ASSESSMENT & PLAN NOTE
Primary generalized osteoarthritis with lumbar spondylosis with neurogenic symptoms.  She has not gone for the MRI of her lumbar spine ordered at the last visit as overall she feels stable to improved.  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  No evidence for DVT on venous duplex.  She does note improvement in her leg symptoms with compression hose.  She is currently having another Euflexxa series right knee.  Follow-up 6 months or sooner if needed.  Encouraged home exercise program as tolerated..

## 2024-08-12 NOTE — ASSESSMENT & PLAN NOTE
DEXA dated 6/12/2023 with normal bone density and low FRAX risk for fracture.  She does have history of left clavicular fracture and 2 rib fractures posttraumatic treated conservatively in 2020.  No need for prescription medication at this time.  Encourage calcium and vitamin D supplement.  Monitor BMD in 2 years.  She will be due in June 2025.  Continue to monitor.

## 2024-08-12 NOTE — ASSESSMENT & PLAN NOTE
Fibromyalgia generally stable with home exercise program with stretching as tolerated.  Continue arthritis strength Tylenol 2 tabs every 12 hours as needed.  She is not interested in trial of prescription medication such as gabapentin or Lyrica at this time.  Continue to monitor.

## 2024-08-12 NOTE — ASSESSMENT & PLAN NOTE
Patient currently receiving Euflexxa series right knee.  She has had benefit with this in the past.  X-rays bilateral knees dated 1/3/2024 significant for bilateral tricompartmental disease.  She is most symptomatic in her right knee.  Continue to monitor her response to Euflexxa.  Continue home exercise program as tolerated.  Continue compression hose for probable venous insufficiency.

## 2024-08-12 NOTE — ASSESSMENT & PLAN NOTE
Chronic venous insufficiency bilateral lower extremities.  She did have evaluation with vascular surgery and will consider sclerotherapy if she has return of significant pain.  Improvement in right calf pain and swelling with compression hose.  Continue to monitor.

## 2024-09-15 ENCOUNTER — APPOINTMENT (EMERGENCY)
Dept: RADIOLOGY | Facility: HOSPITAL | Age: 74
End: 2024-09-15
Payer: COMMERCIAL

## 2024-09-15 ENCOUNTER — HOSPITAL ENCOUNTER (EMERGENCY)
Facility: HOSPITAL | Age: 74
Discharge: HOME | End: 2024-09-15
Attending: EMERGENCY MEDICINE
Payer: COMMERCIAL

## 2024-09-15 VITALS
OXYGEN SATURATION: 98 % | HEART RATE: 73 BPM | TEMPERATURE: 98.1 F | RESPIRATION RATE: 20 BRPM | DIASTOLIC BLOOD PRESSURE: 71 MMHG | SYSTOLIC BLOOD PRESSURE: 148 MMHG

## 2024-09-15 DIAGNOSIS — R52 GENERALIZED BODY ACHES: ICD-10-CM

## 2024-09-15 DIAGNOSIS — R05.1 ACUTE COUGH: ICD-10-CM

## 2024-09-15 DIAGNOSIS — J18.9 PNEUMONIA: Primary | ICD-10-CM

## 2024-09-15 LAB
ALBUMIN SERPL BCG-MCNC: 3.3 G/DL (ref 3.5–5)
ALP SERPL-CCNC: 59 U/L (ref 34–104)
ALT SERPL W P-5'-P-CCNC: 22 U/L (ref 7–52)
ANION GAP SERPL CALCULATED.3IONS-SCNC: 7 MMOL/L (ref 4–13)
AST SERPL W P-5'-P-CCNC: 24 U/L (ref 13–39)
ATRIAL RATE: 65 BPM
BASOPHILS # BLD AUTO: 0.03 THOUSANDS/ΜL (ref 0–0.1)
BASOPHILS NFR BLD AUTO: 0 % (ref 0–1)
BILIRUB SERPL-MCNC: 0.51 MG/DL (ref 0.2–1)
BUN SERPL-MCNC: 11 MG/DL (ref 5–25)
CALCIUM ALBUM COR SERPL-MCNC: 9.1 MG/DL (ref 8.3–10.1)
CALCIUM SERPL-MCNC: 8.5 MG/DL (ref 8.4–10.2)
CHLORIDE SERPL-SCNC: 98 MMOL/L (ref 96–108)
CO2 SERPL-SCNC: 28 MMOL/L (ref 21–32)
CREAT SERPL-MCNC: 0.41 MG/DL (ref 0.6–1.3)
EOSINOPHIL # BLD AUTO: 0.08 THOUSAND/ΜL (ref 0–0.61)
EOSINOPHIL NFR BLD AUTO: 1 % (ref 0–6)
ERYTHROCYTE [DISTWIDTH] IN BLOOD BY AUTOMATED COUNT: 13.3 % (ref 11.6–15.1)
FLUAV AG UPPER RESP QL IA.RAPID: NEGATIVE
FLUBV AG UPPER RESP QL IA.RAPID: NEGATIVE
GFR SERPL CREATININE-BSD FRML MDRD: 103 ML/MIN/1.73SQ M
GLUCOSE SERPL-MCNC: 109 MG/DL (ref 65–140)
HCT VFR BLD AUTO: 36.6 % (ref 34.8–46.1)
HGB BLD-MCNC: 12.8 G/DL (ref 11.5–15.4)
IMM GRANULOCYTES # BLD AUTO: 0.02 THOUSAND/UL (ref 0–0.2)
IMM GRANULOCYTES NFR BLD AUTO: 0 % (ref 0–2)
LIPASE SERPL-CCNC: 20 U/L (ref 11–82)
LYMPHOCYTES # BLD AUTO: 0.86 THOUSANDS/ΜL (ref 0.6–4.47)
LYMPHOCYTES NFR BLD AUTO: 12 % (ref 14–44)
MCH RBC QN AUTO: 34.4 PG (ref 26.8–34.3)
MCHC RBC AUTO-ENTMCNC: 35 G/DL (ref 31.4–37.4)
MCV RBC AUTO: 98 FL (ref 82–98)
MONOCYTES # BLD AUTO: 0.76 THOUSAND/ΜL (ref 0.17–1.22)
MONOCYTES NFR BLD AUTO: 10 % (ref 4–12)
NEUTROPHILS # BLD AUTO: 5.72 THOUSANDS/ΜL (ref 1.85–7.62)
NEUTS SEG NFR BLD AUTO: 77 % (ref 43–75)
NRBC BLD AUTO-RTO: 0 /100 WBCS
P AXIS: 42 DEGREES
PLATELET # BLD AUTO: 183 THOUSANDS/UL (ref 149–390)
PMV BLD AUTO: 10.1 FL (ref 8.9–12.7)
POTASSIUM SERPL-SCNC: 3.2 MMOL/L (ref 3.5–5.3)
PR INTERVAL: 214 MS
PROT SERPL-MCNC: 6 G/DL (ref 6.4–8.4)
QRS AXIS: -45 DEGREES
QRSD INTERVAL: 110 MS
QT INTERVAL: 382 MS
QTC INTERVAL: 397 MS
RBC # BLD AUTO: 3.72 MILLION/UL (ref 3.81–5.12)
SARS-COV+SARS-COV-2 AG RESP QL IA.RAPID: NEGATIVE
SODIUM SERPL-SCNC: 133 MMOL/L (ref 135–147)
T WAVE AXIS: -1 DEGREES
VENTRICULAR RATE: 65 BPM
WBC # BLD AUTO: 7.47 THOUSAND/UL (ref 4.31–10.16)

## 2024-09-15 PROCEDURE — 96361 HYDRATE IV INFUSION ADD-ON: CPT

## 2024-09-15 PROCEDURE — 71046 X-RAY EXAM CHEST 2 VIEWS: CPT

## 2024-09-15 PROCEDURE — 87811 SARS-COV-2 COVID19 W/OPTIC: CPT | Performed by: EMERGENCY MEDICINE

## 2024-09-15 PROCEDURE — 93005 ELECTROCARDIOGRAM TRACING: CPT

## 2024-09-15 PROCEDURE — 80053 COMPREHEN METABOLIC PANEL: CPT | Performed by: EMERGENCY MEDICINE

## 2024-09-15 PROCEDURE — 83690 ASSAY OF LIPASE: CPT | Performed by: EMERGENCY MEDICINE

## 2024-09-15 PROCEDURE — 85025 COMPLETE CBC W/AUTO DIFF WBC: CPT | Performed by: EMERGENCY MEDICINE

## 2024-09-15 PROCEDURE — 36415 COLL VENOUS BLD VENIPUNCTURE: CPT | Performed by: EMERGENCY MEDICINE

## 2024-09-15 PROCEDURE — 96375 TX/PRO/DX INJ NEW DRUG ADDON: CPT

## 2024-09-15 PROCEDURE — 99284 EMERGENCY DEPT VISIT MOD MDM: CPT

## 2024-09-15 PROCEDURE — 87804 INFLUENZA ASSAY W/OPTIC: CPT | Performed by: EMERGENCY MEDICINE

## 2024-09-15 PROCEDURE — 96365 THER/PROPH/DIAG IV INF INIT: CPT

## 2024-09-15 PROCEDURE — 99284 EMERGENCY DEPT VISIT MOD MDM: CPT | Performed by: EMERGENCY MEDICINE

## 2024-09-15 PROCEDURE — 93010 ELECTROCARDIOGRAM REPORT: CPT | Performed by: INTERNAL MEDICINE

## 2024-09-15 RX ORDER — KETOROLAC TROMETHAMINE 30 MG/ML
15 INJECTION, SOLUTION INTRAMUSCULAR; INTRAVENOUS ONCE
Status: COMPLETED | OUTPATIENT
Start: 2024-09-15 | End: 2024-09-15

## 2024-09-15 RX ORDER — CEFDINIR 300 MG/1
300 CAPSULE ORAL EVERY 12 HOURS SCHEDULED
Qty: 14 CAPSULE | Refills: 0 | Status: SHIPPED | OUTPATIENT
Start: 2024-09-15 | End: 2024-09-22

## 2024-09-15 RX ORDER — GUAIFENESIN 600 MG/1
1200 TABLET, EXTENDED RELEASE ORAL EVERY 12 HOURS SCHEDULED
Qty: 28 TABLET | Refills: 0 | Status: SHIPPED | OUTPATIENT
Start: 2024-09-15 | End: 2024-09-22

## 2024-09-15 RX ORDER — POTASSIUM CHLORIDE 1500 MG/1
40 TABLET, EXTENDED RELEASE ORAL ONCE
Status: COMPLETED | OUTPATIENT
Start: 2024-09-15 | End: 2024-09-15

## 2024-09-15 RX ORDER — AZITHROMYCIN 250 MG/1
TABLET, FILM COATED ORAL
Qty: 6 TABLET | Refills: 0 | Status: SHIPPED | OUTPATIENT
Start: 2024-09-15 | End: 2024-09-19

## 2024-09-15 RX ADMIN — SODIUM CHLORIDE 1000 ML: 0.9 INJECTION, SOLUTION INTRAVENOUS at 11:49

## 2024-09-15 RX ADMIN — POTASSIUM CHLORIDE 40 MEQ: 1500 TABLET, EXTENDED RELEASE ORAL at 12:23

## 2024-09-15 RX ADMIN — CEFTRIAXONE SODIUM 1000 MG: 10 INJECTION, POWDER, FOR SOLUTION INTRAVENOUS at 12:23

## 2024-09-15 RX ADMIN — KETOROLAC TROMETHAMINE 15 MG: 30 INJECTION, SOLUTION INTRAMUSCULAR at 11:49

## 2024-09-15 NOTE — ED PROVIDER NOTES
1. Pneumonia    2. Acute cough    3. Generalized body aches      ED Disposition       ED Disposition   Discharge    Condition   Stable    Date/Time   Sun Sep 15, 2024 12:18 PM    Comment   Alissa May discharge to home/self care.                   Assessment & Plan       Medical Decision Making  74-year-old female no significant reported past history presenting with cough body aches.  Signs symptoms concerning for infectious etiology such as pneumonia.  Plan for basic labs.  EKG.  Chest x-ray.  Reassess.    EKG interpreted by me with normal sinus rhythm and nonspecific ST abnormality.  Chest x-ray concerning for possible pneumonia.  Will give dose of antibiotics.  Prescription sent to pharmacy.  Labs interpreted by me notable for potassium 3.2.  Repleted with p.o. potassium.  Symptoms improving with fluids and medication. Discussed results and recommendations. Advised follow up PCP. Medication recommendations. Given instructions and return precautions. Patient/family at bedside acknowledged understanding of all written and verbal instructions and return precautions. Discharged.     Amount and/or Complexity of Data Reviewed  Labs: ordered.  Radiology: ordered.    Risk  OTC drugs.  Prescription drug management.                       Medications   sodium chloride 0.9 % bolus 1,000 mL (1,000 mL Intravenous New Bag 9/15/24 1149)   ceftriaxone (ROCEPHIN) 1 g/50 mL in dextrose IVPB (1,000 mg Intravenous New Bag 9/15/24 1223)   ketorolac (TORADOL) injection 15 mg (15 mg Intravenous Given 9/15/24 1149)   potassium chloride (Klor-Con M20) CR tablet 40 mEq (40 mEq Oral Given 9/15/24 1223)       History of Present Illness       74-year-old female no significant reported past history presenting with cough body aches.  Patient reports 5-day history of symptoms including cough productive of yellowish-greenish sputum, generalized bodyaches, fatigue.  Last fever couple days ago.  Took Tylenol this morning.  Denies any chest pain  shortness of breath.  Denies any abdominal pain nausea vomiting diarrhea.  Denies any other complaints.  Chart reviewed.    Past Medical History:  No date: Disease of thyroid gland  No date: Fibromyalgia, primary  No date: Fractures  No date: Hyperlipemia  No date: Lumbar spondylosis  No date: Osteoarthritis  No date: Venous insufficiency  No date: Vertigo  Family History: non-contributory  Social History              Review of Systems   Constitutional:  Positive for fatigue and fever. Negative for appetite change, chills, diaphoresis and unexpected weight change.   HENT:  Negative for congestion and rhinorrhea.    Eyes:  Negative for photophobia and visual disturbance.   Respiratory:  Positive for cough. Negative for chest tightness and shortness of breath.    Cardiovascular:  Negative for chest pain, palpitations and leg swelling.   Gastrointestinal:  Negative for abdominal distention, abdominal pain, blood in stool, constipation, diarrhea, nausea and vomiting.   Genitourinary:  Negative for dysuria and hematuria.   Musculoskeletal:  Positive for myalgias. Negative for back pain, joint swelling, neck pain and neck stiffness.   Skin:  Negative for color change, pallor, rash and wound.   Neurological:  Negative for dizziness, syncope, weakness, light-headedness and headaches.   Psychiatric/Behavioral:  Negative for agitation.    All other systems reviewed and are negative.          Objective     ED Triage Vitals   Temperature Pulse Blood Pressure Respirations SpO2 Patient Position - Orthostatic VS   09/15/24 1125 09/15/24 1125 09/15/24 1125 09/15/24 1125 09/15/24 1125 --   98.1 °F (36.7 °C) 73 148/71 20 98 %       Temp src Heart Rate Source BP Location FiO2 (%) Pain Score    -- -- -- -- 09/15/24 1149        5        Physical Exam  Vitals and nursing note reviewed.   Constitutional:       General: She is not in acute distress.     Appearance: She is well-developed. She is ill-appearing. She is not toxic-appearing or  diaphoretic.   HENT:      Head: Normocephalic and atraumatic.      Nose: Nose normal. No congestion or rhinorrhea.      Mouth/Throat:      Mouth: Mucous membranes are moist.      Pharynx: Oropharynx is clear. No oropharyngeal exudate or posterior oropharyngeal erythema.   Eyes:      General: No scleral icterus.        Right eye: No discharge.         Left eye: No discharge.      Extraocular Movements: Extraocular movements intact.      Conjunctiva/sclera: Conjunctivae normal.      Pupils: Pupils are equal, round, and reactive to light.   Neck:      Vascular: No JVD.      Trachea: No tracheal deviation.      Comments: Supple. Normal range of motion.   Cardiovascular:      Rate and Rhythm: Normal rate and regular rhythm.      Heart sounds: Normal heart sounds. No murmur heard.     No friction rub. No gallop.      Comments: Normal rate and regular rhythm  Pulmonary:      Effort: Pulmonary effort is normal. No respiratory distress.      Breath sounds: No stridor. Rhonchi present. No wheezing or rales.      Comments: Bibasilar rhonchi  Chest:      Chest wall: No tenderness.   Abdominal:      General: Bowel sounds are normal. There is no distension.      Palpations: Abdomen is soft.      Tenderness: There is no abdominal tenderness. There is no right CVA tenderness, left CVA tenderness, guarding or rebound.      Comments: Soft, nontender, nondistended.  Normal bowel sounds throughout   Musculoskeletal:         General: No swelling, tenderness, deformity or signs of injury. Normal range of motion.      Cervical back: Normal range of motion and neck supple. No rigidity. No muscular tenderness.      Right lower leg: No edema.      Left lower leg: No edema.   Lymphadenopathy:      Cervical: No cervical adenopathy.   Skin:     General: Skin is warm and dry.      Coloration: Skin is not pale.      Findings: No erythema or rash.   Neurological:      General: No focal deficit present.      Mental Status: She is alert. Mental  status is at baseline.      Sensory: No sensory deficit.      Motor: No weakness or abnormal muscle tone.      Coordination: Coordination normal.      Gait: Gait normal.      Comments: Alert.  Strength and sensation grossly intact.  Ambulatory without difficulty at baseline.    Psychiatric:         Behavior: Behavior normal.         Thought Content: Thought content normal.         Labs Reviewed   CBC AND DIFFERENTIAL - Abnormal       Result Value    WBC 7.47      RBC 3.72 (*)     Hemoglobin 12.8      Hematocrit 36.6      MCV 98      MCH 34.4 (*)     MCHC 35.0      RDW 13.3      MPV 10.1      Platelets 183      nRBC 0      Segmented % 77 (*)     Immature Grans % 0      Lymphocytes % 12 (*)     Monocytes % 10      Eosinophils Relative 1      Basophils Relative 0      Absolute Neutrophils 5.72      Absolute Immature Grans 0.02      Absolute Lymphocytes 0.86      Absolute Monocytes 0.76      Eosinophils Absolute 0.08      Basophils Absolute 0.03     COMPREHENSIVE METABOLIC PANEL - Abnormal    Sodium 133 (*)     Potassium 3.2 (*)     Chloride 98      CO2 28      ANION GAP 7      BUN 11      Creatinine 0.41 (*)     Glucose 109      Calcium 8.5      Corrected Calcium 9.1      AST 24      ALT 22      Alkaline Phosphatase 59      Total Protein 6.0 (*)     Albumin 3.3 (*)     Total Bilirubin 0.51      eGFR 103      Narrative:     National Kidney Disease Foundation guidelines for Chronic Kidney Disease (CKD):     Stage 1 with normal or high GFR (GFR > 90 mL/min/1.73 square meters)    Stage 2 Mild CKD (GFR = 60-89 mL/min/1.73 square meters)    Stage 3A Moderate CKD (GFR = 45-59 mL/min/1.73 square meters)    Stage 3B Moderate CKD (GFR = 30-44 mL/min/1.73 square meters)    Stage 4 Severe CKD (GFR = 15-29 mL/min/1.73 square meters)    Stage 5 End Stage CKD (GFR <15 mL/min/1.73 square meters)  Note: GFR calculation is accurate only with a steady state creatinine   COVID-19/INFLUENZA A/B RAPID ANTIGEN (30 MIN.TAT) - Normal    SARS  COV Rapid Antigen Negative      Influenza A Rapid Antigen Negative      Influenza B Rapid Antigen Negative      Narrative:     This test has been performed using the [employer] Radha 2 FLU+SARS Antigen test under the Emergency Use Authorization (EUA). This test has been validated by the  and verified by the performing laboratory. The Radha uses lateral flow immunofluorescent sandwich assay to detect SARS-COV, Influenza A and Influenza B Antigen.     The [employer] Radha 2 SARS Antigen test does not differentiate between SARS-CoV and SARS-CoV-2.     Negative results are presumptive and may be confirmed with a molecular assay, if necessary, for patient management. Negative results do not rule out SARS-CoV-2 or influenza infection and should not be used as the sole basis for treatment or patient management decisions. A negative test result may occur if the level of antigen in a sample is below the limit of detection of this test.     Positive results are indicative of the presence of viral antigens, but do not rule out bacterial infection or co-infection with other viruses.     All test results should be used as an adjunct to clinical observations and other information available to the provider.    FOR PEDIATRIC PATIENTS - copy/paste COVID Guidelines URL to browser: [email or web address]   LIPASE - Normal    Lipase 20       XR chest 2 views    (Results Pending)       Procedures       Matthew Bauer MD  09/15/24 9149

## 2024-09-16 ENCOUNTER — VBI (OUTPATIENT)
Age: 74
End: 2024-09-16

## 2024-09-16 NOTE — TELEPHONE ENCOUNTER
09/16/24 10:08 AM    Patient contacted post ED visit, [facility] department spoke with patient/caregiver and outreach was successful.    Thank you.  Beth Christian MA  [facility]

## 2024-09-25 ENCOUNTER — OFFICE VISIT (OUTPATIENT)
Age: 74
End: 2024-09-25
Payer: COMMERCIAL

## 2024-09-25 VITALS
WEIGHT: 157.6 LBS | SYSTOLIC BLOOD PRESSURE: 120 MMHG | BODY MASS INDEX: 29 KG/M2 | OXYGEN SATURATION: 97 % | HEART RATE: 97 BPM | TEMPERATURE: 98.4 F | DIASTOLIC BLOOD PRESSURE: 68 MMHG | HEIGHT: 62 IN | RESPIRATION RATE: 16 BRPM

## 2024-09-25 DIAGNOSIS — Z00.00 MEDICARE ANNUAL WELLNESS VISIT, SUBSEQUENT: Primary | ICD-10-CM

## 2024-09-25 DIAGNOSIS — Z12.12 SCREENING FOR COLORECTAL CANCER: ICD-10-CM

## 2024-09-25 DIAGNOSIS — Z12.11 SCREENING FOR COLORECTAL CANCER: ICD-10-CM

## 2024-09-25 DIAGNOSIS — Z12.31 ENCOUNTER FOR SCREENING MAMMOGRAM FOR BREAST CANCER: ICD-10-CM

## 2024-09-25 DIAGNOSIS — J18.9 PNEUMONIA DUE TO INFECTIOUS ORGANISM, UNSPECIFIED LATERALITY, UNSPECIFIED PART OF LUNG: ICD-10-CM

## 2024-09-25 DIAGNOSIS — E87.6 HYPOKALEMIA: ICD-10-CM

## 2024-09-25 DIAGNOSIS — Z13.6 SCREENING FOR CARDIOVASCULAR CONDITION: ICD-10-CM

## 2024-09-25 PROCEDURE — 99213 OFFICE O/P EST LOW 20 MIN: CPT

## 2024-09-25 PROCEDURE — G0439 PPPS, SUBSEQ VISIT: HCPCS

## 2024-09-25 NOTE — PROGRESS NOTES
Ambulatory Visit  Name: Alissa Bryant      : 1950      MRN: [id number]  Encounter Provider: Gayathri Zhang PA-C  Encounter Date: 2024   Encounter department: [facility] PRIMARY CARE [facility]  Medicare annual wellness visit, subsequent    Annual physical exam completed today.  - Medical history reviewed, including existing medical conditions, medications, and surgeries.   - Labs discussed to evaluate cholesterol, blood sugar, kidney function, liver function, and other important markers of health.  - BMI evaluated and discussed.  - Cancer screenings discussed: Mammogram/pap smear/colonoscopy.   - Vaccination status reviewed and pertinent immunizations and booster shots discussed.   - Bone health reviewed.   - Skin examination.  Discussed importance of sunscreen and other preventative measures for skin cancer.    - Lifestyle and health counseling completed including diet, exercise habits, smoking status, alcohol consumption.   - Mental health and wellbeing evaluated and discussed.  - Family history obtained to identify any of hereditary health risks.     Recommend getting pneumonia vaccine when feeling better.  Up-to-date on tetanus.         Pneumonia due to infectious organism, unspecified laterality, unspecified part of lung  Doing much better, afebrile.  Cough can last for up to two months.  Return if fever, myalgias return.       Hypokalemia  Repeat labs today.  Replete as necessary.    Orders:    Basic metabolic panel; Future    Screening for cardiovascular condition  Will check a lipid panel, total cholesterol was mildly high 2 years ago.  Patient will return when fasting.  Orders:    Lipid panel; Future    Screening for colorectal cancer  Patient is due to have a repeat Cologuard.  No symptoms or blood in stool.  If this 1 is normal, can likely discontinue colorectal cancer screening.  Orders:    Cologuard    Encounter for screening mammogram for breast cancer  Is  been over a year, patient encouraged to get the mammogram done.  Orders:    Mammo screening bilateral w 3d and cad; Future       Preventive health issues were discussed with patient, and age appropriate screening tests were ordered as noted in patient's After Visit Summary. Personalized health advice and appropriate referrals for health education or preventive services given if needed, as noted in patient's After Visit Summary.    History of Present Illness     HPI     Pt is here for AWV and follow up after pneumonia.    Pt was put on cefdinir and azithromycin for 7 days.  She continues to feel some fatigue and cough, but overall doing much better.  Patient did not get the pneumonia vaccine in the past that she is aware of.  Patient did have some hypokalemia with a 3.2, will repeat a BMP today.  Otherwise patient is doing well.    Of note, patient is evaluated by hematology for leukopenia and macrocytosis.  Believe it is her baseline and she returns in 6 months for follow-up.  Patient denies any symptoms.      Patient Care Team:  Lolis Perez DO as PCP - General (Family Medicine)    Review of Systems   Constitutional:  Positive for fatigue. Negative for chills, diaphoresis and fever.   HENT: Negative.     Respiratory:  Negative for cough, chest tightness and shortness of breath.    Cardiovascular:  Negative for chest pain.   Gastrointestinal: Negative.    Genitourinary: Negative.    Musculoskeletal:  Negative for gait problem.   Skin:  Negative for rash.   Neurological:  Negative for syncope, light-headedness and headaches.   All other systems reviewed and are negative.    Medical History Reviewed by provider this encounter:       Annual Wellness Visit Questionnaire   Alissa is here for her Subsequent Wellness visit.     Health Risk Assessment:   Patient rates overall health as very good. Patient feels that their physical health rating is same. Patient is satisfied with their life. Eyesight was rated  as same. Hearing was rated as same. Patient feels that their emotional and mental health rating is same. Patients states they are never, rarely angry. Patient states they are sometimes unusually tired/fatigued. Pain experienced in the last 7 days has been some. Patient's pain rating has been 7/10. Patient states that she has experienced no weight loss or gain in last 6 months.     Depression Screening:   PHQ-2 Score: 0      Fall Risk Screening:   In the past year, patient has experienced: no history of falling in past year      Urinary Incontinence Screening:   Patient has not leaked urine accidently in the last six months.     Home Safety:  Patient has trouble with stairs inside or outside of their home. Patient has working smoke alarms and has working carbon monoxide detector. Home safety hazards include: none.     Nutrition:   Current diet is Low Carb. Healthy limited meat some fruit lots of water or vitamin water low carbs    Medications:   Patient is not currently taking any over-the-counter supplements. Patient is able to manage medications. Doesn't take  prescription medication    Activities of Daily Living (ADLs)/Instrumental Activities of Daily Living (IADLs):   Walk and transfer into and out of bed and chair?: Yes  Dress and groom yourself?: Yes    Bathe or shower yourself?: Yes    Feed yourself? Yes  Do your laundry/housekeeping?: Yes  Manage your money, pay your bills and track your expenses?: Yes  Make your own meals?: Yes    Do your own shopping?: Yes    Previous Hospitalizations:   Any hospitalizations or ED visits within the last 12 months?: No      Advance Care Planning:   Living will: Yes    Advanced directive: Yes      PREVENTIVE SCREENINGS      Cardiovascular Screening:    General: Screening Current    Due for: Lipid Panel      Diabetes Screening:     General: Screening Current      Colorectal Cancer Screening:     General: Screening Current    Due for: Cologuard      Breast Cancer Screening:      General: Risks and Benefits Discussed    Due for: Mammogram        Cervical Cancer Screening:    General: Screening Not Indicated      Osteoporosis Screening:    General: Screening Not Indicated and History Osteoporosis      Abdominal Aortic Aneurysm (AAA) Screening:        General: Screening Not Indicated      Lung Cancer Screening:     General: Screening Not Indicated      Hepatitis C Screening:    General: Screening Current    Screening, Brief Intervention, and Referral to Treatment (SBIRT)    Screening  Typical number of drinks in a day: 2  Typical number of drinks in a week: 8  Interpretation: Low risk drinking behavior.    Single Item Drug Screening:  How often have you used an illegal drug (including marijuana) or a prescription medication for non-medical reasons in the past year? never    Single Item Drug Screen Score: 0  Interpretation: Negative screen for possible drug use disorder    Other Counseling Topics:   Car/seat belt/driving safety, skin self-exam, sunscreen and calcium and vitamin D intake and regular weightbearing exercise.     Social Determinants of Health     Financial Resource Strain: Medium Risk (9/6/2023)    Overall Financial Resource Strain (CARDIA)     Difficulty of Paying Living Expenses: Somewhat hard   Transportation Needs: No Transportation Needs (9/6/2023)    PRAPARE - Transportation     Lack of Transportation (Medical): No     Lack of Transportation (Non-Medical): No    Received from [facility]     No results found.    Objective     Wt 71.5 kg (157 lb 9.6 oz)   BMI 28.83 kg/m²     Physical Exam  Vitals and nursing note reviewed.   Constitutional:       General: She is not in acute distress.     Appearance: Normal appearance. She is not toxic-appearing.   HENT:      Head: Normocephalic and atraumatic.      Jaw: There is normal jaw occlusion.      Right Ear: Hearing, tympanic membrane, ear canal and external ear normal.      Left Ear: Hearing, tympanic membrane, ear canal  and external ear normal.      Nose: Nose normal.      Mouth/Throat:      Lips: Pink.      Mouth: Mucous membranes are moist. No oral lesions.      Tongue: No lesions.      Palate: No mass.      Pharynx: Oropharynx is clear. Uvula midline.   Eyes:      General: Lids are normal. Vision grossly intact.      Conjunctiva/sclera: Conjunctivae normal.      Pupils: Pupils are equal, round, and reactive to light.   Neck:      Thyroid: No thyroid mass, thyromegaly or thyroid tenderness.      Vascular: No carotid bruit.   Cardiovascular:      Rate and Rhythm: Normal rate and regular rhythm.      Pulses:           Radial pulses are 2+ on the right side and 2+ on the left side.        Dorsalis pedis pulses are 2+ on the right side and 2+ on the left side.      Heart sounds: Normal heart sounds. No murmur heard.  Pulmonary:      Effort: Pulmonary effort is normal. No respiratory distress.      Breath sounds: Normal breath sounds.   Abdominal:      General: Abdomen is flat. Bowel sounds are normal.      Palpations: Abdomen is soft.      Tenderness: There is no abdominal tenderness.   Musculoskeletal:         General: No tenderness, deformity or signs of injury.      Cervical back: Full passive range of motion without pain.      Right lower leg: No edema.      Left lower leg: No edema.   Lymphadenopathy:      Cervical: No cervical adenopathy.   Skin:     General: Skin is warm and dry.      Coloration: Skin is not jaundiced.   Neurological:      Mental Status: She is alert and oriented to person, place, and time.      GCS: GCS eye subscore is 4. GCS verbal subscore is 5. GCS motor subscore is 6.      Gait: Gait is intact.   Psychiatric:         Behavior: Behavior is cooperative.

## 2024-09-25 NOTE — PATIENT INSTRUCTIONS
Medicare Preventive Visit Patient Instructions  Thank you for completing your Welcome to Medicare Visit or Medicare Annual Wellness Visit today. Your next wellness visit will be due in one year (9/26/2025).  The screening/preventive services that you may require over the next 5-10 years are detailed below. Some tests may not apply to you based off risk factors and/or age. Screening tests ordered at today's visit but not completed yet may show as past due. Also, please note that scanned in results may not display below.  Preventive Screenings:  Service Recommendations Previous Testing/Comments   Colorectal Cancer Screening  * Colonoscopy    * Fecal Occult Blood Test (FOBT)/Fecal Immunochemical Test (FIT)  * Fecal DNA/Cologuard Test  * Flexible Sigmoidoscopy Age: 45-75 years old   Colonoscopy: every 10 years (may be performed more frequently if at higher risk)  OR  FOBT/FIT: every 1 year  OR  Cologuard: every 3 years  OR  Sigmoidoscopy: every 5 years  Screening may be recommended earlier than age 45 if at higher risk for colorectal cancer. Also, an individualized decision between you and your healthcare provider will decide whether screening between the ages of 76-85 would be appropriate. Colonoscopy: Not on file  FOBT/FIT: Not on file  Cologuard: 10/05/2021  Sigmoidoscopy: Not on file          Breast Cancer Screening Age: 40+ years old  Frequency: every 1-2 years  Not required if history of left and right mastectomy Mammogram: Not on file        Cervical Cancer Screening Between the ages of 21-29, pap smear recommended once every 3 years.   Between the ages of 30-65, can perform pap smear with HPV co-testing every 5 years.   Recommendations may differ for women with a history of total hysterectomy, cervical cancer, or abnormal pap smears in past. Pap Smear: Not on file        Hepatitis C Screening Once for adults born between 1945 and 1965  More frequently in patients at high risk for Hepatitis C Hep C Antibody:  09/07/2022        Diabetes Screening 1-2 times per year if you're at risk for diabetes or have pre-diabetes Fasting glucose: 96 mg/dL (1/3/2024)  A1C: No results in last 5 years (No results in last 5 years)      Cholesterol Screening Once every 5 years if you don't have a lipid disorder. May order more often based on risk factors. Lipid panel: 09/07/2022          Other Preventive Screenings Covered by Medicare:  Abdominal Aortic Aneurysm (AAA) Screening: covered once if your at risk. You're considered to be at risk if you have a family history of AAA.  Lung Cancer Screening: covers low dose CT scan once per year if you meet all of the following conditions: (1) Age 55-77; (2) No signs or symptoms of lung cancer; (3) Current smoker or have quit smoking within the last 15 years; (4) You have a tobacco smoking history of at least 20 pack years (packs per day multiplied by number of years you smoked); (5) You get a written order from a healthcare provider.  Glaucoma Screening: covered annually if you're considered high risk: (1) You have diabetes OR (2) Family history of glaucoma OR (3)  aged 50 and older OR (4)  American aged 65 and older  Osteoporosis Screening: covered every 2 years if you meet one of the following conditions: (1) You're estrogen deficient and at risk for osteoporosis based off medical history and other findings; (2) Have a vertebral abnormality; (3) On glucocorticoid therapy for more than 3 months; (4) Have primary hyperparathyroidism; (5) On osteoporosis medications and need to assess response to drug therapy.   Last bone density test (DXA Scan): 06/12/2023.  HIV Screening: covered annually if you're between the age of 15-65. Also covered annually if you are younger than 15 and older than 65 with risk factors for HIV infection. For pregnant patients, it is covered up to 3 times per pregnancy.    Immunizations:  Immunization Recommendations   Influenza Vaccine Annual influenza  vaccination during flu season is recommended for all persons aged >= 6 months who do not have contraindications   Pneumococcal Vaccine   * Pneumococcal conjugate vaccine = PCV13 (Prevnar 13), PCV15 (Vaxneuvance), PCV20 (Prevnar 20)  * Pneumococcal polysaccharide vaccine = PPSV23 (Pneumovax) Adults 19-63 yo with certain risk factors or if 65+ yo  If never received any pneumonia vaccine: recommend Prevnar 20 (PCV20)  Give PCV20 if previously received 1 dose of PCV13 or PPSV23   Hepatitis B Vaccine 3 dose series if at intermediate or high risk (ex: diabetes, end stage renal disease, liver disease)   Respiratory syncytial virus (RSV) Vaccine - COVERED BY MEDICARE PART D  * RSVPreF3 (Arexvy) CDC recommends that adults 60 years of age and older may receive a single dose of RSV vaccine using shared clinical decision-making (SCDM)   Tetanus (Td) Vaccine - COST NOT COVERED BY MEDICARE PART B Following completion of primary series, a booster dose should be given every 10 years to maintain immunity against tetanus. Td may also be given as tetanus wound prophylaxis.   Tdap Vaccine - COST NOT COVERED BY MEDICARE PART B Recommended at least once for all adults. For pregnant patients, recommended with each pregnancy.   Shingles Vaccine (Shingrix) - COST NOT COVERED BY MEDICARE PART B  2 shot series recommended in those 19 years and older who have or will have weakened immune systems or those 50 years and older     Health Maintenance Due:      Topic Date Due   • Breast Cancer Screening: Mammogram  Never done   • Colorectal Cancer Screening  10/05/2024   • Hepatitis C Screening  Completed     Immunizations Due:      Topic Date Due   • Pneumococcal Vaccine: 65+ Years (1 of 1 - PCV) Never done   • COVID-19 Vaccine (5 - 2023-24 season) 09/01/2024   • Influenza Vaccine (1) 09/01/2024     Advance Directives   What are advance directives?  Advance directives are legal documents that state your wishes and plans for medical care. These  plans are made ahead of time in case you lose your ability to make decisions for yourself. Advance directives can apply to any medical decision, such as the treatments you want, and if you want to donate organs.   What are the types of advance directives?  There are many types of advance directives, and each state has rules about how to use them. You may choose a combination of any of the following:  Living will:  This is a written record of the treatment you want. You can also choose which treatments you do not want, which to limit, and which to stop at a certain time. This includes surgery, medicine, IV fluid, and tube feedings.   Durable power of  for healthcare (DPAHC):  This is a written record that states who you want to make healthcare choices for you when you are unable to make them for yourself. This person, called a proxy, is usually a family member or a friend. You may choose more than 1 proxy.  Do not resuscitate (DNR) order:  A DNR order is used in case your heart stops beating or you stop breathing. It is a request not to have certain forms of treatment, such as CPR. A DNR order may be included in other types of advance directives.  Medical directive:  This covers the care that you want if you are in a coma, near death, or unable to make decisions for yourself. You can list the treatments you want for each condition. Treatment may include pain medicine, surgery, blood transfusions, dialysis, IV or tube feedings, and a ventilator (breathing machine).  Values history:  This document has questions about your views, beliefs, and how you feel and think about life. This information can help others choose the care that you would choose.  Why are advance directives important?  An advance directive helps you control your care. Although spoken wishes may be used, it is better to have your wishes written down. Spoken wishes can be misunderstood, or not followed. Treatments may be given even if you do not  want them. An advance directive may make it easier for your family to make difficult choices about your care.   Weight Management   Why it is important to manage your weight:  Being overweight increases your risk of health conditions such as heart disease, high blood pressure, type 2 diabetes, and certain types of cancer. It can also increase your risk for osteoarthritis, sleep apnea, and other respiratory problems. Aim for a slow, steady weight loss. Even a small amount of weight loss can lower your risk of health problems.  How to lose weight safely:  A safe and healthy way to lose weight is to eat fewer calories and get regular exercise. You can lose up about 1 pound a week by decreasing the number of calories you eat by 500 calories each day.   Healthy meal plan for weight management:  A healthy meal plan includes a variety of foods, contains fewer calories, and helps you stay healthy. A healthy meal plan includes the following:  Eat whole-grain foods more often.  A healthy meal plan should contain fiber. Fiber is the part of grains, fruits, and vegetables that is not broken down by your body. Whole-grain foods are healthy and provide extra fiber in your diet. Some examples of whole-grain foods are whole-wheat breads and pastas, oatmeal, brown rice, and bulgur.  Eat a variety of vegetables every day.  Include dark, leafy greens such as spinach, kale, jerilyn greens, and mustard greens. Eat yellow and orange vegetables such as carrots, sweet potatoes, and winter squash.   Eat a variety of fruits every day.  Choose fresh or canned fruit (canned in its own juice or light syrup) instead of juice. Fruit juice has very little or no fiber.  Eat low-fat dairy foods.  Drink fat-free (skim) milk or 1% milk. Eat fat-free yogurt and low-fat cottage cheese. Try low-fat cheeses such as mozzarella and other reduced-fat cheeses.  Choose meat and other protein foods that are low in fat.  Choose beans or other legumes such as  split peas or lentils. Choose fish, skinless poultry (chicken or turkey), or lean cuts of red meat (beef or pork). Before you cook meat or poultry, cut off any visible fat.   Use less fat and oil.  Try baking foods instead of frying them. Add less fat, such as margarine, sour cream, regular salad dressing and mayonnaise to foods. Eat fewer high-fat foods. Some examples of high-fat foods include french fries, doughnuts, ice cream, and cakes.  Eat fewer sweets.  Limit foods and drinks that are high in sugar. This includes candy, cookies, regular soda, and sweetened drinks.  Exercise:  Exercise at least 30 minutes per day on most days of the week. Some examples of exercise include walking, biking, dancing, and swimming. You can also fit in more physical activity by taking the stairs instead of the elevator or parking farther away from stores. Ask your healthcare provider about the best exercise plan for you.      © Copyright [employer] 2018 Information is for End User's use only and may not be sold, redistributed or otherwise used for commercial purposes. All illustrations and images included in CareNotes® are the copyrighted property of [employer] or [employer]      Medicare Preventive Visit Patient Instructions  Thank you for completing your Welcome to Medicare Visit or Medicare Annual Wellness Visit today. Your next wellness visit will be due in one year (9/26/2025).  The screening/preventive services that you may require over the next 5-10 years are detailed below. Some tests may not apply to you based off risk factors and/or age. Screening tests ordered at today's visit but not completed yet may show as past due. Also, please note that scanned in results may not display below.  Preventive Screenings:  Service Recommendations Previous Testing/Comments   Colorectal Cancer Screening  * Colonoscopy    * Fecal Occult Blood Test (FOBT)/Fecal Immunochemical Test (FIT)  * Fecal DNA/Cologuard Test  *  Flexible Sigmoidoscopy Age: 45-75 years old   Colonoscopy: every 10 years (may be performed more frequently if at higher risk)  OR  FOBT/FIT: every 1 year  OR  Cologuard: every 3 years  OR  Sigmoidoscopy: every 5 years  Screening may be recommended earlier than age 45 if at higher risk for colorectal cancer. Also, an individualized decision between you and your healthcare provider will decide whether screening between the ages of 76-85 would be appropriate. Colonoscopy: Not on file  FOBT/FIT: Not on file  Cologuard: 10/05/2021  Sigmoidoscopy: Not on file          Breast Cancer Screening Age: 40+ years old  Frequency: every 1-2 years  Not required if history of left and right mastectomy Mammogram: Not on file        Cervical Cancer Screening Between the ages of 21-29, pap smear recommended once every 3 years.   Between the ages of 30-65, can perform pap smear with HPV co-testing every 5 years.   Recommendations may differ for women with a history of total hysterectomy, cervical cancer, or abnormal pap smears in past. Pap Smear: Not on file        Hepatitis C Screening Once for adults born between 1945 and 1965  More frequently in patients at high risk for Hepatitis C Hep C Antibody: 09/07/2022        Diabetes Screening 1-2 times per year if you're at risk for diabetes or have pre-diabetes Fasting glucose: 96 mg/dL (1/3/2024)  A1C: No results in last 5 years (No results in last 5 years)      Cholesterol Screening Once every 5 years if you don't have a lipid disorder. May order more often based on risk factors. Lipid panel: 09/07/2022          Other Preventive Screenings Covered by Medicare:  Abdominal Aortic Aneurysm (AAA) Screening: covered once if your at risk. You're considered to be at risk if you have a family history of AAA.  Lung Cancer Screening: covers low dose CT scan once per year if you meet all of the following conditions: (1) Age 55-77; (2) No signs or symptoms of lung cancer; (3) Current smoker or have  quit smoking within the last 15 years; (4) You have a tobacco smoking history of at least 20 pack years (packs per day multiplied by number of years you smoked); (5) You get a written order from a healthcare provider.  Glaucoma Screening: covered annually if you're considered high risk: (1) You have diabetes OR (2) Family history of glaucoma OR (3)  aged 50 and older OR (4)  American aged 65 and older  Osteoporosis Screening: covered every 2 years if you meet one of the following conditions: (1) You're estrogen deficient and at risk for osteoporosis based off medical history and other findings; (2) Have a vertebral abnormality; (3) On glucocorticoid therapy for more than 3 months; (4) Have primary hyperparathyroidism; (5) On osteoporosis medications and need to assess response to drug therapy.   Last bone density test (DXA Scan): 06/12/2023.  HIV Screening: covered annually if you're between the age of 15-65. Also covered annually if you are younger than 15 and older than 65 with risk factors for HIV infection. For pregnant patients, it is covered up to 3 times per pregnancy.    Immunizations:  Immunization Recommendations   Influenza Vaccine Annual influenza vaccination during flu season is recommended for all persons aged >= 6 months who do not have contraindications   Pneumococcal Vaccine   * Pneumococcal conjugate vaccine = PCV13 (Prevnar 13), PCV15 (Vaxneuvance), PCV20 (Prevnar 20)  * Pneumococcal polysaccharide vaccine = PPSV23 (Pneumovax) Adults 19-63 yo with certain risk factors or if 65+ yo  If never received any pneumonia vaccine: recommend Prevnar 20 (PCV20)  Give PCV20 if previously received 1 dose of PCV13 or PPSV23   Hepatitis B Vaccine 3 dose series if at intermediate or high risk (ex: diabetes, end stage renal disease, liver disease)   Respiratory syncytial virus (RSV) Vaccine - COVERED BY MEDICARE PART D  * RSVPreF3 (Arexvy) CDC recommends that adults 60 years of age and  older may receive a single dose of RSV vaccine using shared clinical decision-making (SCDM)   Tetanus (Td) Vaccine - COST NOT COVERED BY MEDICARE PART B Following completion of primary series, a booster dose should be given every 10 years to maintain immunity against tetanus. Td may also be given as tetanus wound prophylaxis.   Tdap Vaccine - COST NOT COVERED BY MEDICARE PART B Recommended at least once for all adults. For pregnant patients, recommended with each pregnancy.   Shingles Vaccine (Shingrix) - COST NOT COVERED BY MEDICARE PART B  2 shot series recommended in those 19 years and older who have or will have weakened immune systems or those 50 years and older     Health Maintenance Due:      Topic Date Due   • Breast Cancer Screening: Mammogram  Never done   • Colorectal Cancer Screening  10/05/2024   • Hepatitis C Screening  Completed     Immunizations Due:      Topic Date Due   • Pneumococcal Vaccine: 65+ Years (1 of 1 - PCV) Never done   • COVID-19 Vaccine (5 - 2023-24 season) 09/01/2024   • Influenza Vaccine (1) 09/01/2024     Advance Directives   What are advance directives?  Advance directives are legal documents that state your wishes and plans for medical care. These plans are made ahead of time in case you lose your ability to make decisions for yourself. Advance directives can apply to any medical decision, such as the treatments you want, and if you want to donate organs.   What are the types of advance directives?  There are many types of advance directives, and each state has rules about how to use them. You may choose a combination of any of the following:  Living will:  This is a written record of the treatment you want. You can also choose which treatments you do not want, which to limit, and which to stop at a certain time. This includes surgery, medicine, IV fluid, and tube feedings.   Durable power of  for healthcare (DPAHC):  This is a written record that states who you want to  make healthcare choices for you when you are unable to make them for yourself. This person, called a proxy, is usually a family member or a friend. You may choose more than 1 proxy.  Do not resuscitate (DNR) order:  A DNR order is used in case your heart stops beating or you stop breathing. It is a request not to have certain forms of treatment, such as CPR. A DNR order may be included in other types of advance directives.  Medical directive:  This covers the care that you want if you are in a coma, near death, or unable to make decisions for yourself. You can list the treatments you want for each condition. Treatment may include pain medicine, surgery, blood transfusions, dialysis, IV or tube feedings, and a ventilator (breathing machine).  Values history:  This document has questions about your views, beliefs, and how you feel and think about life. This information can help others choose the care that you would choose.  Why are advance directives important?  An advance directive helps you control your care. Although spoken wishes may be used, it is better to have your wishes written down. Spoken wishes can be misunderstood, or not followed. Treatments may be given even if you do not want them. An advance directive may make it easier for your family to make difficult choices about your care.   Weight Management   Why it is important to manage your weight:  Being overweight increases your risk of health conditions such as heart disease, high blood pressure, type 2 diabetes, and certain types of cancer. It can also increase your risk for osteoarthritis, sleep apnea, and other respiratory problems. Aim for a slow, steady weight loss. Even a small amount of weight loss can lower your risk of health problems.  How to lose weight safely:  A safe and healthy way to lose weight is to eat fewer calories and get regular exercise. You can lose up about 1 pound a week by decreasing the number of calories you eat by 500  calories each day.   Healthy meal plan for weight management:  A healthy meal plan includes a variety of foods, contains fewer calories, and helps you stay healthy. A healthy meal plan includes the following:  Eat whole-grain foods more often.  A healthy meal plan should contain fiber. Fiber is the part of grains, fruits, and vegetables that is not broken down by your body. Whole-grain foods are healthy and provide extra fiber in your diet. Some examples of whole-grain foods are whole-wheat breads and pastas, oatmeal, brown rice, and bulgur.  Eat a variety of vegetables every day.  Include dark, leafy greens such as spinach, kale, jerilyn greens, and mustard greens. Eat yellow and orange vegetables such as carrots, sweet potatoes, and winter squash.   Eat a variety of fruits every day.  Choose fresh or canned fruit (canned in its own juice or light syrup) instead of juice. Fruit juice has very little or no fiber.  Eat low-fat dairy foods.  Drink fat-free (skim) milk or 1% milk. Eat fat-free yogurt and low-fat cottage cheese. Try low-fat cheeses such as mozzarella and other reduced-fat cheeses.  Choose meat and other protein foods that are low in fat.  Choose beans or other legumes such as split peas or lentils. Choose fish, skinless poultry (chicken or turkey), or lean cuts of red meat (beef or pork). Before you cook meat or poultry, cut off any visible fat.   Use less fat and oil.  Try baking foods instead of frying them. Add less fat, such as margarine, sour cream, regular salad dressing and mayonnaise to foods. Eat fewer high-fat foods. Some examples of high-fat foods include french fries, doughnuts, ice cream, and cakes.  Eat fewer sweets.  Limit foods and drinks that are high in sugar. This includes candy, cookies, regular soda, and sweetened drinks.  Exercise:  Exercise at least 30 minutes per day on most days of the week. Some examples of exercise include walking, biking, dancing, and swimming. You can also  fit in more physical activity by taking the stairs instead of the elevator or parking farther away from stores. Ask your healthcare provider about the best exercise plan for you.      © Copyright [employer] 2018 Information is for End User's use only and may not be sold, redistributed or otherwise used for commercial purposes. All illustrations and images included in CareNotes® are the copyrighted property of [employer] or [employer]

## 2024-10-02 ENCOUNTER — VBI (OUTPATIENT)
Dept: ADMINISTRATIVE | Facility: OTHER | Age: 74
End: 2024-10-02

## 2024-10-02 NOTE — TELEPHONE ENCOUNTER
10/02/24 1:38 PM     Chart reviewed for [facility]: Colonoscopy was/were not submitted to the patient's insurance.     Jeri Paredes MA   [facility]

## 2024-10-11 ENCOUNTER — TELEPHONE (OUTPATIENT)
Age: 74
End: 2024-10-11

## 2024-10-20 LAB — COLOGUARD RESULT REPORTABLE: NEGATIVE

## 2024-10-22 ENCOUNTER — TELEPHONE (OUTPATIENT)
Age: 74
End: 2024-10-22

## 2024-10-22 NOTE — TELEPHONE ENCOUNTER
----- Message from Gayathri Zhang PA-C sent at 10/21/2024  8:28 PM EDT -----  Let Alissa know her cologuard is negative.  Repeat cologuard in 3 years or switch over to a colonoscopy at that time.

## 2024-10-29 ENCOUNTER — VBI (OUTPATIENT)
Dept: ADMINISTRATIVE | Facility: OTHER | Age: 74
End: 2024-10-29

## 2024-10-29 NOTE — TELEPHONE ENCOUNTER
10/29/24 1:14 PM     Chart reviewed for Mammogram was/were not submitted to the patient's insurance.     Jeri Paredes MA   [facility]

## 2024-11-27 ENCOUNTER — TELEPHONE (OUTPATIENT)
Dept: HEMATOLOGY ONCOLOGY | Facility: CLINIC | Age: 74
End: 2024-11-27

## 2024-11-27 ENCOUNTER — DOCUMENTATION (OUTPATIENT)
Dept: HEMATOLOGY ONCOLOGY | Facility: CLINIC | Age: 74
End: 2024-11-27

## 2024-11-27 NOTE — PROGRESS NOTES
Pt unable to make todays appt needs to be r/s. Hope line r/s appt for today when pt originally had appt booked for 12/31 with provider.

## 2024-12-02 ENCOUNTER — CLINICAL SUPPORT (OUTPATIENT)
Age: 74
End: 2024-12-02
Payer: COMMERCIAL

## 2024-12-02 VITALS
TEMPERATURE: 98.2 F | DIASTOLIC BLOOD PRESSURE: 70 MMHG | BODY MASS INDEX: 28.89 KG/M2 | HEART RATE: 65 BPM | SYSTOLIC BLOOD PRESSURE: 120 MMHG | WEIGHT: 157 LBS | OXYGEN SATURATION: 98 % | HEIGHT: 62 IN

## 2024-12-02 DIAGNOSIS — M17.11 PRIMARY OSTEOARTHRITIS OF RIGHT KNEE: Primary | ICD-10-CM

## 2024-12-02 PROCEDURE — 20610 DRAIN/INJ JOINT/BURSA W/O US: CPT | Performed by: INTERNAL MEDICINE

## 2024-12-02 RX ORDER — HYALURONATE SODIUM 10 MG/ML
20 SYRINGE (ML) INTRAARTICULAR
Status: COMPLETED | OUTPATIENT
Start: 2024-12-02 | End: 2024-12-02

## 2024-12-02 RX ADMIN — Medication 20 MG: at 14:30

## 2024-12-02 NOTE — PROGRESS NOTES
Large joint arthrocentesis: R knee  [place] Protocol:  procedure performed by consultantConsent: Verbal consent obtained.  Risks and benefits: risks, benefits and alternatives were discussed  Consent given by: patient  Timeout called at: 12/2/2024 3:00 PM.  Patient understanding: patient states understanding of the procedure being performed  Patient consent: the patient's understanding of the procedure matches consent given  Procedure consent: procedure consent matches procedure scheduled  Relevant documents: relevant documents present and verified  Test results: test results available and properly labeled  Site marked: the operative site was marked  Radiology Images displayed and confirmed. If images not available, report reviewed: imaging studies available  Patient identity confirmed: verbally with patient  Supporting Documentation  Indications: pain   Procedure Details  Location: knee - R knee  Preparation: Patient was prepped and draped in the usual sterile fashion  Needle size: 20 G  Ultrasound guidance: no  Approach: medial  Medications administered: 20 mg Sodium Hyaluronate (Viscosup) 20 MG/2ML    Patient tolerance: patient tolerated the procedure well with no immediate complications  Dressing:  Sterile dressing applied     Told to ice 4 times a day for 4 days.

## 2024-12-09 ENCOUNTER — CLINICAL SUPPORT (OUTPATIENT)
Age: 74
End: 2024-12-09

## 2024-12-09 VITALS
TEMPERATURE: 97 F | HEART RATE: 41 BPM | SYSTOLIC BLOOD PRESSURE: 118 MMHG | DIASTOLIC BLOOD PRESSURE: 84 MMHG | WEIGHT: 159.8 LBS | OXYGEN SATURATION: 98 % | BODY MASS INDEX: 29.4 KG/M2 | HEIGHT: 62 IN

## 2024-12-09 DIAGNOSIS — M17.11 PRIMARY OSTEOARTHRITIS OF RIGHT KNEE: Primary | ICD-10-CM

## 2024-12-09 RX ORDER — HYALURONATE SODIUM 10 MG/ML
20 SYRINGE (ML) INTRAARTICULAR
Status: COMPLETED | OUTPATIENT
Start: 2024-12-09 | End: 2024-12-09

## 2024-12-09 RX ADMIN — Medication 20 MG: at 12:15

## 2024-12-09 NOTE — PROGRESS NOTES
Large joint arthrocentesis: R knee  [facility] Protocol:  procedure performed by consultantConsent: Verbal consent obtained.  Risks and benefits: risks, benefits and alternatives were discussed  Consent given by: patient  Timeout called at: 12/9/2024 1:01 PM.  Patient understanding: patient states understanding of the procedure being performed  Patient consent: the patient's understanding of the procedure matches consent given  Procedure consent: procedure consent matches procedure scheduled  Relevant documents: relevant documents present and verified  Test results: test results available and properly labeled  Site marked: the operative site was marked  Radiology Images displayed and confirmed. If images not available, report reviewed: imaging studies available  Patient identity confirmed: verbally with patient  Supporting Documentation  Indications: pain   Procedure Details  Location: knee - R knee  Preparation: Patient was prepped and draped in the usual sterile fashion  Needle size: 20 G  Ultrasound guidance: no  Approach: medial  Medications administered: 20 mg Sodium Hyaluronate (Viscosup) 20 MG/2ML    Patient tolerance: patient tolerated the procedure well with no immediate complications  Dressing:  Sterile dressing applied     Told to ice 4 times a day for 4 days.

## 2024-12-11 ENCOUNTER — APPOINTMENT (OUTPATIENT)
Dept: LAB | Facility: HOSPITAL | Age: 74
End: 2024-12-11
Payer: COMMERCIAL

## 2024-12-11 DIAGNOSIS — Z13.6 SCREENING FOR CARDIOVASCULAR CONDITION: ICD-10-CM

## 2024-12-11 DIAGNOSIS — E87.6 HYPOKALEMIA: ICD-10-CM

## 2024-12-11 DIAGNOSIS — D72.819 LEUKOPENIA, UNSPECIFIED TYPE: ICD-10-CM

## 2024-12-11 DIAGNOSIS — R70.1 RETICULOCYTOSIS: ICD-10-CM

## 2024-12-11 LAB
ANION GAP SERPL CALCULATED.3IONS-SCNC: 5 MMOL/L (ref 4–13)
BASOPHILS # BLD AUTO: 0.08 THOUSAND/UL (ref 0–0.1)
BASOPHILS NFR MAR MANUAL: 2 % (ref 0–1)
BUN SERPL-MCNC: 17 MG/DL (ref 5–25)
CALCIUM SERPL-MCNC: 9.2 MG/DL (ref 8.4–10.2)
CHLORIDE SERPL-SCNC: 98 MMOL/L (ref 96–108)
CHOLEST SERPL-MCNC: 203 MG/DL (ref ?–200)
CO2 SERPL-SCNC: 29 MMOL/L (ref 21–32)
CREAT SERPL-MCNC: 0.57 MG/DL (ref 0.6–1.3)
EOSINOPHIL # BLD AUTO: 0.08 THOUSAND/UL (ref 0–0.61)
EOSINOPHIL NFR BLD MANUAL: 2 % (ref 0–6)
ERYTHROCYTE [DISTWIDTH] IN BLOOD BY AUTOMATED COUNT: 13.4 % (ref 11.6–15.1)
GFR SERPL CREATININE-BSD FRML MDRD: 92 ML/MIN/1.73SQ M
GLUCOSE P FAST SERPL-MCNC: 98 MG/DL (ref 65–99)
HCT VFR BLD AUTO: 42.1 % (ref 34.8–46.1)
HDLC SERPL-MCNC: 101 MG/DL
HGB BLD-MCNC: 14.3 G/DL (ref 11.5–15.4)
LDLC SERPL CALC-MCNC: 92 MG/DL (ref 0–100)
LYMPHOCYTES # BLD AUTO: 1.22 THOUSAND/UL (ref 0.6–4.47)
LYMPHOCYTES # BLD AUTO: 30 %
MACROCYTES BLD QL AUTO: PRESENT
MCH RBC QN AUTO: 35 PG (ref 26.8–34.3)
MCHC RBC AUTO-ENTMCNC: 34 G/DL (ref 31.4–37.4)
MCV RBC AUTO: 103 FL (ref 82–98)
MONOCYTES # BLD AUTO: 0.2 THOUSAND/UL (ref 0–1.22)
MONOCYTES NFR BLD AUTO: 5 % (ref 4–12)
NEUTS SEG # BLD: 2.49 THOUSAND/UL (ref 1.81–6.82)
NEUTS SEG NFR BLD AUTO: 61 %
NONHDLC SERPL-MCNC: 102 MG/DL
NRBC BLD AUTO-RTO: 0 /100 WBCS
PLATELET # BLD AUTO: 184 THOUSANDS/UL (ref 149–390)
PMV BLD AUTO: 11.4 FL (ref 8.9–12.7)
POTASSIUM SERPL-SCNC: 4.2 MMOL/L (ref 3.5–5.3)
RBC # BLD AUTO: 4.09 MILLION/UL (ref 3.81–5.12)
RBC MORPH BLD: PRESENT
RETICS # AUTO: ABNORMAL 10*3/UL (ref 14097–95744)
RETICS # CALC: 2.16 % (ref 0.37–1.87)
SODIUM SERPL-SCNC: 132 MMOL/L (ref 135–147)
TOTAL CELLS COUNTED SPEC: 100
TRIGL SERPL-MCNC: 48 MG/DL (ref ?–150)
WBC # BLD AUTO: 4.08 THOUSAND/UL (ref 4.31–10.16)

## 2024-12-11 PROCEDURE — 85045 AUTOMATED RETICULOCYTE COUNT: CPT

## 2024-12-11 PROCEDURE — 80061 LIPID PANEL: CPT

## 2024-12-11 PROCEDURE — 36415 COLL VENOUS BLD VENIPUNCTURE: CPT

## 2024-12-11 PROCEDURE — 80048 BASIC METABOLIC PNL TOTAL CA: CPT

## 2024-12-11 PROCEDURE — 85007 BL SMEAR W/DIFF WBC COUNT: CPT

## 2024-12-12 ENCOUNTER — RESULTS FOLLOW-UP (OUTPATIENT)
Dept: HEMATOLOGY ONCOLOGY | Facility: CLINIC | Age: 74
End: 2024-12-12

## 2024-12-12 DIAGNOSIS — D75.89 MACROCYTOSIS WITHOUT ANEMIA: ICD-10-CM

## 2024-12-12 DIAGNOSIS — R70.1 RETICULOCYTOSIS: Primary | ICD-10-CM

## 2024-12-12 DIAGNOSIS — D72.819 LEUKOPENIA, UNSPECIFIED TYPE: ICD-10-CM

## 2024-12-13 ENCOUNTER — RESULTS FOLLOW-UP (OUTPATIENT)
Age: 74
End: 2024-12-13

## 2024-12-13 NOTE — TELEPHONE ENCOUNTER
----- Message from Gayathri Zhang PA-C sent at 12/13/2024  8:06 AM EST -----  Please let the pt know her potassium went back to normal, no further treatment.  Cholesterol panel was normal as well.    Yolanda from heme-onc will follow up regarding her other labs.

## 2024-12-16 ENCOUNTER — OFFICE VISIT (OUTPATIENT)
Age: 74
End: 2024-12-16

## 2024-12-16 VITALS
OXYGEN SATURATION: 98 % | WEIGHT: 158.8 LBS | HEIGHT: 62 IN | SYSTOLIC BLOOD PRESSURE: 122 MMHG | HEART RATE: 43 BPM | TEMPERATURE: 97.4 F | BODY MASS INDEX: 29.22 KG/M2 | DIASTOLIC BLOOD PRESSURE: 74 MMHG

## 2024-12-16 DIAGNOSIS — M15.0 PRIMARY GENERALIZED (OSTEO)ARTHRITIS: Primary | ICD-10-CM

## 2024-12-16 DIAGNOSIS — D72.819 CHRONIC LEUKOPENIA: ICD-10-CM

## 2024-12-16 DIAGNOSIS — M85.89 OSTEOPENIA OF MULTIPLE SITES: ICD-10-CM

## 2024-12-16 DIAGNOSIS — I87.2 CHRONIC VENOUS INSUFFICIENCY: ICD-10-CM

## 2024-12-16 DIAGNOSIS — M79.7 FIBROMYALGIA: ICD-10-CM

## 2024-12-16 DIAGNOSIS — M72.2 PLANTAR FASCIITIS: ICD-10-CM

## 2024-12-16 DIAGNOSIS — M47.816 LUMBAR SPONDYLOSIS: ICD-10-CM

## 2024-12-16 DIAGNOSIS — M17.11 PRIMARY OSTEOARTHRITIS OF RIGHT KNEE: ICD-10-CM

## 2024-12-16 DIAGNOSIS — M17.0 PRIMARY OSTEOARTHRITIS OF BOTH KNEES: ICD-10-CM

## 2024-12-16 RX ADMIN — Medication 20 MG: at 13:15

## 2024-12-16 NOTE — PROGRESS NOTES
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  She did not go for the MRI of her lumbar spine ordered at a prior visit as overall she feels stable to improved.  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  No evidence for DVT on venous duplex.  She does note improvement in her leg symptoms with compression hose.  She will complete another Euflexxa series right knee at this visit.  See procedure note.  Monitor labs for medication toxicity.  Follow-up 6 months or sooner if needed.  Encouraged home exercise program as tolerated..    Primary osteoarthritis of both knees  Patient currently receiving Euflexxa series right knee.  She has had benefit with this in the past.  We can repeat this every 6 months if needed.  X-rays bilateral knees dated 1/3/2024 significant for bilateral tricompartmental disease.  She is most symptomatic in her right knee.  Continue to monitor her response to Euflexxa.  Continue home exercise program as tolerated.  Continue compression hose for probable venous insufficiency.      Osteopenia of multiple sites  Most recent DEXA dated 6/12/2023 significant for normal bone density and low FRAX risk for fracture.  She does have history of left clavicular fracture and 2 rib fractures posttraumatic treated conservatively in 2020.  No need for prescription medication at this time.  Encourage calcium and vitamin D supplement.  Monitor BMD in 2 years.  She will be due in June 2025.  Continue to monitor.    Plantar fasciitis  No current symptoms referable to plantar fasciitis.  She does note that if she stands on her feet for long period of time, that she can notice mild plantar fascia symptoms.  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  Continue heel stretching.  Avoid barefoot walking.  Encourage supportive shoe gear.  Continue  to monitor.    Lumbar spondylosis  Neurogenic symptoms with prolonged standing at work which improves with sitting.  Right calf pain improved with compression hose for venous disease if back symptoms worsen, will reorder lumbar spine MRI.  She did not feel her symptoms were significant enough to go for lumbar spine MRI ordered at the last visit.  Encouraged home exercises as tolerated and stretching.  Continue to monitor.  Follow-up 6 months or sooner if needed.    Chronic leukopenia  Chronic neutropenia.  The most recent lab work dated 12/11/2024 significant for white blood cell count 4.08 with absolute neutrophil count 2.16.  No history of recurrent fevers or recurrent infections.  Continue to monitor.  If her absolute neutrophil count went below 1000, she would need hematology consultation.    Fibromyalgia  Fibromyalgia symptoms generally stable with home exercise program and stretching program as tolerated.  Continue arthritis strength Tylenol 2 tabs every 12 hours as needed.  She does not feel that her symptoms warrant prescription medication such as gabapentin or Lyrica at this time.  If symptoms worsen, the patient will call the office we will consider physical therapy and medication.  Continue to monitor.         Problem List Items Addressed This Visit     Primary generalized (osteo)arthritis - Primary    Primary generalized osteoarthritis with lumbar spondylosis with neurogenic symptoms.  She did not go for the MRI of her lumbar spine ordered at a prior visit as overall she feels stable to improved.  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  No evidence for DVT on venous duplex.  She does note improvement in her leg symptoms with compression hose.  She will complete another Euflexxa series right knee at this visit.  See procedure note.  Monitor labs for medication toxicity.  Follow-up 6 months or sooner if needed.  Encouraged home exercise program as  tolerated..         Fibromyalgia    Fibromyalgia symptoms generally stable with home exercise program and stretching program as tolerated.  Continue arthritis strength Tylenol 2 tabs every 12 hours as needed.  She does not feel that her symptoms warrant prescription medication such as gabapentin or Lyrica at this time.  If symptoms worsen, the patient will call the office we will consider physical therapy and medication.  Continue to monitor.         Lumbar spondylosis    Neurogenic symptoms with prolonged standing at work which improves with sitting.  Right calf pain improved with compression hose for venous disease if back symptoms worsen, will reorder lumbar spine MRI.  She did not feel her symptoms were significant enough to go for lumbar spine MRI ordered at the last visit.  Encouraged home exercises as tolerated and stretching.  Continue to monitor.  Follow-up 6 months or sooner if needed.         Primary osteoarthritis of both knees    Patient currently receiving Euflexxa series right knee.  She has had benefit with this in the past.  We can repeat this every 6 months if needed.  X-rays bilateral knees dated 1/3/2024 significant for bilateral tricompartmental disease.  She is most symptomatic in her right knee.  Continue to monitor her response to Euflexxa.  Continue home exercise program as tolerated.  Continue compression hose for probable venous insufficiency.           Plantar fasciitis    No current symptoms referable to plantar fasciitis.  She does note that if she stands on her feet for long period of time, that she can notice mild plantar fascia symptoms.  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  Continue heel stretching.  Avoid barefoot walking.  Encourage supportive shoe gear.  Continue to monitor.         Chronic venous insufficiency    Osteopenia of multiple sites    Most recent DEXA dated 6/12/2023 significant for normal bone density and low FRAX risk for fracture.   She does have history of left clavicular fracture and 2 rib fractures posttraumatic treated conservatively in 2020.  No need for prescription medication at this time.  Encourage calcium and vitamin D supplement.  Monitor BMD in 2 years.  She will be due in June 2025.  Continue to monitor.         Chronic leukopenia    Chronic neutropenia.  The most recent lab work dated 12/11/2024 significant for white blood cell count 4.08 with absolute neutrophil count 2.16.  No history of recurrent fevers or recurrent infections.  Continue to monitor.  If her absolute neutrophil count went below 1000, she would need hematology consultation.         Primary osteoarthritis of right knee    Relevant Orders    Large joint arthrocentesis: R knee (Completed)             Reviewed records, labs, and imaging with the patient in detail.  Counseled patient.  Discussion regarding my findings and recommendations.  Office visit with documentation 25 min.    Subjective:      Patient ID: Alissa Bryant is a 74 y.o. female.    Patient primary generalized osteoarthritis, with lumbar spondylosis with neurogenic symptoms consistent with stenosis, and diffuse DJD.  History DJD bilateral knees right greater than left, with overall benefit with prior right knee Euflexxa series completed in October.  She is currently getting another Euflexxa series. She still has soreness in the calf with swelling at the end of a long working day.  Suspect this is secondary to her Baker's cyst. History of macrocytosis without anemia.  She has seen Hematology who is just monitoring.  Chronic leukopenia stable.  Note patient had mildly elevated reticulocyte count which the hematologist did not feel was clinically significant.  History traumatic left clavicle and rib fractures.  Myofascial syndrome with symptoms weather related.  History left shoulder arthralgias left rotator cuff tear/infraspinatus partial tear and history right biceps rupture secondary to trauma.  Venous  insufficiency ankles and feet with spider veins and varicose veins.  She was evaluated by vascular surgery who felt the symptoms with pain were consistent with either superficial phlebitis or stasis dermatitis. She is wearing venous compression hose daily.  History plantar fasciitis intermittently active.    Lab work dated 12/11/2024 significant for white blood cell count 4.08 with absolute neutrophil count 2.49.  MCV knee 103.  MCH 35.  Basophils 2%.  Reticulocyte count 88,300 with retake 2.16% slightly increased.  Total cholesterol 203.  Triglyceride 48.  .  LDL 92.  Sodium 132.  Creatinine 0.57 with estimated GFR 92.  Calcium 9.2.  Review of lab work dated 2/20/2024 significant for iron 149.  Ferritin 38.  Hemolysis smear negative for schistocytes or helmet cells.  YVONNE negative.  TSH 1.744.  Lab work dated 1/3/2024 significant for flow cytometry negative.  Retake count 2.09 slightly increased.   normal.  Haptoglobin low at 35.  KIM negative.  White blood cell count 3.89 with MCV mildly elevated at 99.  Platelet count 184.  Vitamin B12 925 slightly high.  Creatinine 0.56 with estimated GFR 94.  Sodium 131.  Calcium 9.6.  Folate 19.8.  Review of lab work dated 9/12/2023 significant for white blood cell count 3.49 with absolute neutrophil count 1.87.  .  MCHC elevated at 34.4.  Platelets 189.  Sodium 132.  Calcium 9.6.  Estimated GFR 94.  Lab work dated 6/14/2023 significant for white blood cell count 3.96 with absolute neutrophil count 2.24.  Macrocytosis noted.  Vitamin D 42.1.  CRP 1.6.  Sodium 132.  Calcium 9.0.  Estimated GFR 94.  Sed rate less than 1.  CRP 1.6.  Review of lab work dated 09/07/2022 significant for white blood cell count 4.12 with absolute neutrophil count 1.98.   stable.  Platelets 210.  Hepatitis-C antibody negative.  Creatinine 0.53 with estimated GFR 96.  Fasting blood sugar 101.  Calcium 8.8.  Total cholesterol 206.  Triglycerides 42.  .  LDL 92.  Lipid  profile and glucose improved.    DEXA dated 6/12/2023 significant for L1 and L4 T-score +0.6.  Left total hip -0.7 with femoral neck +0.2.  Left distal forearm -0.8.  Low FRAX risk for fracture.    X-ray bilateral knees dated 1/3/2024 significant for tricompartmental DJD bilaterally.        Allergies  Allergies   Allergen Reactions   • Codeine GI Intolerance   • Prednisone Other (See Comments)     Shaking and hallucinations       Home Medications    Current Outpatient Medications:   •  Cholecalciferol (VITAMIN D3) 239801 UNIT/GM POWD, Take by mouth Daily, Disp: , Rfl:   •  Coconut Oil 1000 MG CAPS, daily, Disp: , Rfl:   •  Coenzyme Q10 (COQ-10) 10 MG CAPS, , Disp: , Rfl:   •  Flaxseed, Linseed, (FLAXSEED OIL) 1000 MG CAPS, daily, Disp: , Rfl:   •  Glucosamine-Chondroitin 7009-8355 MG/30ML LIQD, Take by mouth, Disp: , Rfl:   •  Milk Thistle 1000 MG CAPS, daily, Disp: , Rfl:   •  TART CHERRY PO, Take by mouth, Disp: , Rfl:   •  vitamin E 100 UNIT capsule, Take 100 Units by mouth daily, Disp: , Rfl:     Past Medical History  Past Medical History:   Diagnosis Date   • Disease of thyroid gland    • Fibromyalgia, primary    • Fractures    • Hyperlipemia    • Lumbar spondylosis    • Osteoarthritis    • Venous insufficiency    • Vertigo        Past Surgical History   Past Surgical History:   Procedure Laterality Date   • EYE SURGERY Bilateral        Family History   Family History   Problem Relation Age of Onset   • Cancer Mother    • Arthritis Mother    • Cancer Father    • Kidney cancer Sister    • Diabetes Sister    • Heart attack Brother        The following portions of the patient's history were reviewed and updated as appropriate: allergies, current medications, past family history, past medical history, past social history, past surgical history, and problem list.    Review of Systems   Constitutional:  Negative for chills and fever.   HENT:  Negative for ear pain and sore throat.    Eyes:  Negative for pain and  "visual disturbance.   Respiratory:  Negative for cough and shortness of breath.    Cardiovascular:  Positive for leg swelling. Negative for chest pain and palpitations.        VV's LE's with spider veins. History burning sensations along PTT resolved. Trace edema.   Gastrointestinal:  Negative for abdominal pain and vomiting.   Genitourinary:  Negative for dysuria and hematuria.   Musculoskeletal:  Positive for arthralgias, back pain and neck stiffness.        No significant morning stiffness.  She is generally worse at the end of the day.  She had complaints of burning in the ankles medial aspect along the posterior tibial tendon which resolved. She has significant varicosities and spider veins and is using compression hose for this. Right calf pain and swelling with prolonged standing.  She has been taking tylenol and occas ASA for pain. Knee pain right greater than left, with improvement with Euflexxa series right knee.  She is currently getting another Euflexxa series for her right knee.  No Raynaud's.  No sicca symptoms.  No current plantar fasciitis.  Neurogenic symptoms with prolonged standing.  She notes neck stiffness.  Sleep is interrupted frequently at night with early wakening. Hx clavicle fracture and 2 rib fractures 7/2020 with injury in garden with rolling forward and continuing to roll.   Skin:  Negative for color change and rash.   Neurological:  Positive for dizziness. Negative for seizures and syncope.        Vertigo stable   All other systems reviewed and are negative.        Objective:      /74   Pulse (!) 43   Temp (!) 97.4 °F (36.3 °C) (Tympanic)   Ht 5' 2\" (1.575 m)   Wt 72 kg (158 lb 12.8 oz)   SpO2 98%   BMI 29.04 kg/m²          Physical Exam  Vitals reviewed.   Constitutional:       Appearance: Normal appearance.   HENT:      Head: Normocephalic.      Nose:      Comments: Nose and throat unremarkable.  Eyes:      Extraocular Movements: Extraocular movements intact.   Neck:      " Comments: Without masses, thyromegaly, lymphadenopathy  Cardiovascular:      Rate and Rhythm: Regular rhythm.   Pulmonary:      Breath sounds: Normal breath sounds.   Abdominal:      Palpations: Abdomen is soft.   Musculoskeletal:      Cervical back: Neck supple.      Comments: Neck slightly decreased lateral flexion with spasm posterior cervical and shoulder girdle muscles.  Shoulders right slight decreased external rotation.  Left full range of motion.  Elbows and wrists full range of motion.  Tinel's negative bilaterally.  Hands squaring 1st carpometacarpal joints bilaterally with Heberden's Vicki's nodes.  No synovitis noted.  Z thumb deformities noted bilaterally.  Straight leg raising negative bilaterally.  Spasm noted in the lower lumbosacral paraspinals.  Hips full range of motion.  Knees slightly decreased flexion right with small cool effusions right>left appreciated.  Fullness popliteal fossa right greater than left.  Probable right Baker's cyst.  No palpable cords noted.  Negative Homans.  Bilateral patellofemoral crepitus.  Ankles full range of motion.  Feet rearfoot hyperpronation with hallux valgus deformities and early hammertoe deformities.  No synovitis noted.   Skin:     General: Skin is warm.      Comments: Marked spider veins lower extremities particularly medial aspect ankles in addition to varicosities. Stasis changes left medial ankle.  Homans negative, but there is slight  increased calf size in the right lower extremity as compared to the left.  No ulcerations noted.   Neurological:      General: No focal deficit present.           Large joint arthrocentesis: R knee  [facility] Protocol:  procedure performed by consultantConsent: Verbal consent obtained.  Risks and benefits: risks, benefits and alternatives were discussed  Consent given by: patient  Timeout called at: 12/16/2024 3:08 PM.  Patient understanding: patient states understanding of the procedure being performed  Patient  consent: the patient's understanding of the procedure matches consent given  Procedure consent: procedure consent matches procedure scheduled  Relevant documents: relevant documents present and verified  Test results: test results available and properly labeled  Site marked: the operative site was marked  Radiology Images displayed and confirmed. If images not available, report reviewed: imaging studies available  Patient identity confirmed: verbally with patient  Supporting Documentation  Indications: pain   Procedure Details  Location: knee - R knee  Preparation: Patient was prepped and draped in the usual sterile fashion  Needle size: 20 G  Ultrasound guidance: no  Approach: medial  Medications administered: 20 mg Sodium Hyaluronate (Viscosup) 20 MG/2ML    Patient tolerance: patient tolerated the procedure well with no immediate complications  Dressing:  Sterile dressing applied     Told to ice 4 times a day for 4 days.            This note was written in part using the assistance of the [employer] [id number] microphone system. Those portions using this system have been dictated and not read.

## 2024-12-16 NOTE — PATIENT INSTRUCTIONS
If you want to repeat the Euflexxa series, the first daily it would be available would be June 17, 2025.  It always has to be 6 months after the last series.  Continue the arthritis Tylenol when needed.  Continue follow-up with primary care and hematology.  Continue home exercise program as tolerated.

## 2025-01-07 PROBLEM — M85.89 OSTEOPENIA OF MULTIPLE SITES: Status: ACTIVE | Noted: 2022-05-17

## 2025-01-07 RX ORDER — HYALURONATE SODIUM 10 MG/ML
20 SYRINGE (ML) INTRAARTICULAR
Status: COMPLETED | OUTPATIENT
Start: 2024-12-16 | End: 2024-12-16

## 2025-01-07 NOTE — ASSESSMENT & PLAN NOTE
Neurogenic symptoms with prolonged standing at work which improves with sitting.  Right calf pain improved with compression hose for venous disease if back symptoms worsen, will reorder lumbar spine MRI.  She did not feel her symptoms were significant enough to go for lumbar spine MRI ordered at the last visit.  Encouraged home exercises as tolerated and stretching.  Continue to monitor.  Follow-up 6 months or sooner if needed.

## 2025-01-07 NOTE — ASSESSMENT & PLAN NOTE
No current symptoms referable to plantar fasciitis.  She does note that if she stands on her feet for long period of time, that she can notice mild plantar fascia symptoms.  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  Continue heel stretching.  Avoid barefoot walking.  Encourage supportive shoe gear.  Continue to monitor.

## 2025-01-07 NOTE — ASSESSMENT & PLAN NOTE
Primary generalized osteoarthritis with lumbar spondylosis with neurogenic symptoms.  She did not go for the MRI of her lumbar spine ordered at a prior visit as overall she feels stable to improved.  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  No evidence for DVT on venous duplex.  She does note improvement in her leg symptoms with compression hose.  She will complete another Euflexxa series right knee at this visit.  See procedure note.  Monitor labs for medication toxicity.  Follow-up 6 months or sooner if needed.  Encouraged home exercise program as tolerated..

## 2025-01-07 NOTE — ASSESSMENT & PLAN NOTE
Patient currently receiving Euflexxa series right knee.  She has had benefit with this in the past.  We can repeat this every 6 months if needed.  X-rays bilateral knees dated 1/3/2024 significant for bilateral tricompartmental disease.  She is most symptomatic in her right knee.  Continue to monitor her response to Euflexxa.  Continue home exercise program as tolerated.  Continue compression hose for probable venous insufficiency.

## 2025-01-07 NOTE — ASSESSMENT & PLAN NOTE
Most recent DEXA dated 6/12/2023 significant for normal bone density and low FRAX risk for fracture.  She does have history of left clavicular fracture and 2 rib fractures posttraumatic treated conservatively in 2020.  No need for prescription medication at this time.  Encourage calcium and vitamin D supplement.  Monitor BMD in 2 years.  She will be due in June 2025.  Continue to monitor.

## 2025-01-07 NOTE — ASSESSMENT & PLAN NOTE
Fibromyalgia symptoms generally stable with home exercise program and stretching program as tolerated.  Continue arthritis strength Tylenol 2 tabs every 12 hours as needed.  She does not feel that her symptoms warrant prescription medication such as gabapentin or Lyrica at this time.  If symptoms worsen, the patient will call the office we will consider physical therapy and medication.  Continue to monitor.

## 2025-01-07 NOTE — ASSESSMENT & PLAN NOTE
Chronic neutropenia.  The most recent lab work dated 12/11/2024 significant for white blood cell count 4.08 with absolute neutrophil count 2.16.  No history of recurrent fevers or recurrent infections.  Continue to monitor.  If her absolute neutrophil count went below 1000, she would need hematology consultation.

## 2025-01-22 ENCOUNTER — APPOINTMENT (OUTPATIENT)
Dept: LAB | Facility: HOSPITAL | Age: 75
End: 2025-01-22
Payer: COMMERCIAL

## 2025-01-22 DIAGNOSIS — D75.89 MACROCYTOSIS WITHOUT ANEMIA: ICD-10-CM

## 2025-01-22 DIAGNOSIS — D72.819 LEUKOPENIA, UNSPECIFIED TYPE: ICD-10-CM

## 2025-01-22 DIAGNOSIS — R70.1 RETICULOCYTOSIS: ICD-10-CM

## 2025-01-22 LAB
EOSINOPHIL NFR BLD MANUAL: 2 % (ref 0–6)
LYMPHOCYTES # BLD AUTO: 19 %
MACROCYTES BLD QL AUTO: PRESENT
MONOCYTES NFR BLD AUTO: 9 % (ref 4–12)
NEUTS SEG NFR BLD AUTO: 70 %
PLATELET BLD QL SMEAR: ADEQUATE
RBC MORPH BLD: PRESENT
TOTAL CELLS COUNTED SPEC: 100

## 2025-01-22 PROCEDURE — 83010 ASSAY OF HAPTOGLOBIN QUANT: CPT

## 2025-01-22 PROCEDURE — 85007 BL SMEAR W/DIFF WBC COUNT: CPT

## 2025-01-22 PROCEDURE — 85555 RBC OSMOTIC FRAGILITY: CPT

## 2025-01-22 PROCEDURE — 84165 PROTEIN E-PHORESIS SERUM: CPT

## 2025-01-22 PROCEDURE — 36415 COLL VENOUS BLD VENIPUNCTURE: CPT

## 2025-01-23 LAB
ALBUMIN SERPL ELPH-MCNC: 3.83 G/DL (ref 3.2–5.1)
ALBUMIN SERPL ELPH-MCNC: 63.9 % (ref 48–70)
ALPHA1 GLOB SERPL ELPH-MCNC: 0.22 G/DL (ref 0.15–0.47)
ALPHA1 GLOB SERPL ELPH-MCNC: 3.7 % (ref 1.8–7)
ALPHA2 GLOB SERPL ELPH-MCNC: 0.44 G/DL (ref 0.42–1.04)
ALPHA2 GLOB SERPL ELPH-MCNC: 7.4 % (ref 5.9–14.9)
BETA GLOB ABNORMAL SERPL ELPH-MCNC: 0.35 G/DL (ref 0.31–0.57)
BETA1 GLOB SERPL ELPH-MCNC: 5.8 % (ref 4.7–7.7)
BETA2 GLOB SERPL ELPH-MCNC: 4.3 % (ref 3.1–7.9)
BETA2+GAMMA GLOB SERPL ELPH-MCNC: 0.26 G/DL (ref 0.2–0.58)
GAMMA GLOB ABNORMAL SERPL ELPH-MCNC: 0.89 G/DL (ref 0.4–1.66)
GAMMA GLOB SERPL ELPH-MCNC: 14.9 % (ref 6.9–22.3)
HAPTOGLOB SERPL-MCNC: 38 MG/DL (ref 42–346)
IGG/ALB SER: 1.77 {RATIO} (ref 1.1–1.8)
PROT PATTERN SERPL ELPH-IMP: ABNORMAL
PROT SERPL-MCNC: 6 G/DL (ref 6.4–8.2)

## 2025-01-23 PROCEDURE — 84165 PROTEIN E-PHORESIS SERUM: CPT | Performed by: STUDENT IN AN ORGANIZED HEALTH CARE EDUCATION/TRAINING PROGRAM

## 2025-01-27 ENCOUNTER — OFFICE VISIT (OUTPATIENT)
Dept: HEMATOLOGY ONCOLOGY | Facility: CLINIC | Age: 75
End: 2025-01-27
Payer: COMMERCIAL

## 2025-01-27 VITALS
SYSTOLIC BLOOD PRESSURE: 116 MMHG | BODY MASS INDEX: 29.26 KG/M2 | OXYGEN SATURATION: 98 % | RESPIRATION RATE: 18 BRPM | DIASTOLIC BLOOD PRESSURE: 82 MMHG | HEIGHT: 62 IN | TEMPERATURE: 97.7 F | WEIGHT: 159 LBS | HEART RATE: 62 BPM

## 2025-01-27 DIAGNOSIS — D75.89 MACROCYTOSIS WITHOUT ANEMIA: ICD-10-CM

## 2025-01-27 DIAGNOSIS — D72.819 LEUKOPENIA, UNSPECIFIED TYPE: Primary | ICD-10-CM

## 2025-01-27 PROCEDURE — 99213 OFFICE O/P EST LOW 20 MIN: CPT | Performed by: PHYSICIAN ASSISTANT

## 2025-01-27 NOTE — ASSESSMENT & PLAN NOTE
Chronic  SPEP, TSH, MPN workup unremarkable  Possibly patient's baseline versus low-grade MDS  Continue observation with CBC every 6m   Orders:    CBC and differential; Future    CBC and differential; Future

## 2025-01-27 NOTE — PROGRESS NOTES
Name: Alissa Bryant      : 1950      MRN: [id number]  Encounter Provider: Yolanda Vang PA-C  Encounter Date: 2025   Encounter department: [facility] [place]  :  In summary this is a 74-year-old female who presents as follow-up for leukopenia, macrocytosis which have have been present for many years.  Most recent CBC shows WBC 4.08, hemoglobin 14.3, , platelets 194,000.  WBC differential is normal.  Assessment & Plan  Leukopenia, unspecified type  Chronic, MPN and additional workup as listed in HPI unremarkable.   Recommended peripheral testing for MDS cytogenetics versus bone marrow biopsy to evaluate for MDS versus other hematological malignancy.  Both were declined by patient and she is in favor of continued observation as she is not symptomatic and overall feels well.   Additionally recommended repeat imaging of the spleen as previously not well-visualized.  Patient declined.  CBC every 6 months.  She was offered continued observation by PCP versus our clinic, she prefers the latter.  RTC 12 months    Orders:    CBC and differential; Future    CBC and differential; Future    Macrocytosis without anemia  Chronic  SPEP, TSH, MPN workup unremarkable  Possibly patient's baseline versus low-grade MDS  Continue observation with CBC every 6m   Orders:    CBC and differential; Future    CBC and differential; Future        History of Present Illness   Chief Complaint   Patient presents with    Follow-up     74-year-old female with past medical history of arthritis, spondylosis, fibromyalgia, osteoporosis who was initially seen in consultation in  for macrocytic RBC macrocytosis, reticulocytosis.  On chart review, macrocytosis has been present since at least 2017.  At this time, patient did not have any constitutional symptoms.  She was referred back to her PCP for continued observation.  Patient has been referred back to our office for evaluation  of leukopenia in September 2023.  Labs at this time revealed WBC 3.49 with normal differential, hemoglobin 14.9, , platelets 189,000.     Limited data points on chart review however it appears that the leukopenia has been present since September 2022.  Patient denies history of chronic infections such as hepatitis C or HIV.  Her previous hep C workup was negative.  She has been been going to rheumatologist for 25 years for osteoarthritis, lumbar spondylosis, and fibromyalgia. Not on medication for OA.  Currently only taking turmeric, calcium/vitamin D and Aleve prn.  Patient denies history of bariatric surgery or malabsorption disorder.  No known liver disease or splenomegaly.  Patient denies constitutional symptoms such as unexplained fever, night sweats, unintentional weight loss or lymphadenopathy.  No abdominal pain, changes in appetite, hematochezia or melena.     Never smoker. Has 1- 2 glasses of wine per day. Work as a .      No personal history of cancer. Father has history of prostate cancer.  Mother had history of lymphoma.     11/2023 Abdominal US: Normal liver. Spleen suboptimally visualized     01/2024: WBC 3.89, hgb 14.3, MCV 99, plat 184k. haptoglobin 35. Retic 2.09%. . B12 925. Folate 19.8. Copper normal. KIM neg. Flow cytometry negative. Na 131, otherwise BMP unremarkable. TSH normal      06/2024: WBC 3.65, hemoglobin 12.1, , platelets 192,000.  Increase in relative basophilia 2% with normal absolute basophil count.  WBC differential otherwise normal.     12/2024: WBC 4.08, hemoglobin 14.3, , platelets 184,000  Retic normal   SPEP no monoclonal band   Haptoglobin low 38  Osmotic fragility test in process   Peripheral smear normal     Pertinent Medical History   01/27/25  Denies fever, night sweats, significant fatigue, unintentional weight loss, bruising, lymphadenopathy.  She consumes healthy, regular diet.  No chest pain, shortness of breath or abdominal  "pain.    Review of Systems   All other systems reviewed and are negative.          Objective   Resp 18   Ht 5' 2\" (1.575 m)   Wt 72.1 kg (159 lb)   BMI 29.08 kg/m²     Physical Exam  Vitals reviewed.   HENT:      Head: Normocephalic.   Cardiovascular:      Rate and Rhythm: Normal rate and regular rhythm.   Pulmonary:      Effort: Pulmonary effort is normal.      Breath sounds: Normal breath sounds.   Abdominal:      Palpations: Abdomen is soft.      Tenderness: There is no abdominal tenderness.   Musculoskeletal:      Cervical back: Neck supple.   Lymphadenopathy:      Cervical: No cervical adenopathy.      Upper Body:      Right upper body: No supraclavicular or axillary adenopathy.      Left upper body: No supraclavicular or axillary adenopathy.   Skin:     Findings: No rash.   Neurological:      Mental Status: She is alert.         Labs: I have reviewed the following labs:  Results for orders placed or performed in visit on 01/22/25   Peripheral Smear   Result Value Ref Range    Total Counted 100     Segmented % 70 %    Lymphocytes % 19 %    Monocytes % 9 4 - 12 %    Eosinophils % 2 0 - 6 %    RBC Morphology Present     Platelet Estimate Adequate Adequate    Macrocytes Present    Result Value Ref Range    Haptoglobin 38 (L) 42 - 346 mg/dL   Protein electrophoresis, serum   Result Value Ref Range    A/G Ratio 1.77 1.10 - 1.80    Albumin Electrophoresis 63.9 48.0 - 70.0 %    Albumin CONC 3.83 3.20 - 5.10 g/dl    Alpha 1 3.7 1.8 - 7.0 %    ALPHA 1 CONC 0.22 0.15 - 0.47 g/dL    Alpha 2 7.4 5.9 - 14.9 %    ALPHA 2 CONC 0.44 0.42 - 1.04 g/dL    Beta-1 5.8 4.7 - 7.7 %    BETA 1 CONC 0.35 0.31 - 0.57 g/dL    Beta-2 4.3 3.1 - 7.9 %    BETA 2 CONC 0.26 0.20 - 0.58 g/dL    Gamma Globulin 14.9 6.9 - 22.3 %    GAMMA CONC 0.89 0.40 - 1.66 g/dL    Total Protein 6.0 (L) 6.4 - 8.2 g/dL    SPEP Interpretation See Comment      Lab Results   Component Value Date/Time    WBC 4.08 (L) 12/11/2024 11:23 AM    RBC 4.09 12/11/2024 " "11:23 AM    Hemoglobin 14.3 12/11/2024 11:23 AM    Hematocrit 42.1 12/11/2024 11:23 AM     (H) 12/11/2024 11:23 AM    MCH 35.0 (H) 12/11/2024 11:23 AM    RDW 13.4 12/11/2024 11:23 AM    Platelets 184 12/11/2024 11:23 AM    Segmented % 77 (H) 09/15/2024 11:48 AM    Lymphocytes % 19 01/22/2025 12:20 PM    Lymphocytes % 12 (L) 09/15/2024 11:48 AM    Monocytes % 9 01/22/2025 12:20 PM    Monocytes % 10 09/15/2024 11:48 AM    Eosinophils % 2 01/22/2025 12:20 PM    Eosinophils Relative 1 09/15/2024 11:48 AM    Basophils % 2 (H) 12/11/2024 11:23 AM    Basophils Relative 0 09/15/2024 11:48 AM    Immature Grans % 0 09/15/2024 11:48 AM    Absolute Neutrophils 2.49 12/11/2024 11:23 AM    Absolute Neutrophils 5.72 09/15/2024 11:48 AM      Lab Results   Component Value Date/Time    Sodium 132 (L) 12/11/2024 11:23 AM    Potassium 4.2 12/11/2024 11:23 AM    Chloride 98 12/11/2024 11:23 AM    CO2 29 12/11/2024 11:23 AM    ANION GAP 5 12/11/2024 11:23 AM    BUN 17 12/11/2024 11:23 AM    Creatinine 0.57 (L) 12/11/2024 11:23 AM    Glucose 109 09/15/2024 11:48 AM    Glucose, Fasting 98 12/11/2024 11:23 AM    Calcium 9.2 12/11/2024 11:23 AM    Corrected Calcium 9.1 09/15/2024 11:48 AM    AST 24 09/15/2024 11:48 AM    ALT 22 09/15/2024 11:48 AM    Alkaline Phosphatase 59 09/15/2024 11:48 AM    Total Protein 6.0 (L) 01/22/2025 12:20 PM    Total Protein 6.0 (L) 09/15/2024 11:48 AM    Albumin 3.3 (L) 09/15/2024 11:48 AM    Total Bilirubin 0.51 09/15/2024 11:48 AM    eGFR 92 12/11/2024 11:23 AM      No results found for: \"IRON\", \"CONCFE\", \"FERRITIN\", \"XGUAEIWV23\", \"FOLATE\", \"COPPER\", \"EPOREFLAB\", \"ERYTHROPRO\", \"ESR\", \"CRP\", \"HIVAGAB\", \"HEPATITIS\"           "

## 2025-01-29 LAB
Lab: NORMAL
Lab: NORMAL
MISCELLANEOUS LAB TEST RESULT: NORMAL

## 2025-04-07 ENCOUNTER — VBI (OUTPATIENT)
Dept: ADMINISTRATIVE | Facility: OTHER | Age: 75
End: 2025-04-07

## 2025-04-07 NOTE — TELEPHONE ENCOUNTER
04/07/25 1:47 PM     Chart reviewed for Mammogram was/were not submitted to the patient's insurance.     Jeri Paredes MA   [facility]

## 2025-07-14 ENCOUNTER — CLINICAL SUPPORT (OUTPATIENT)
Age: 75
End: 2025-07-14
Payer: COMMERCIAL

## 2025-07-14 VITALS
SYSTOLIC BLOOD PRESSURE: 136 MMHG | TEMPERATURE: 98.2 F | HEIGHT: 63 IN | OXYGEN SATURATION: 97 % | HEART RATE: 71 BPM | BODY MASS INDEX: 28 KG/M2 | DIASTOLIC BLOOD PRESSURE: 72 MMHG | WEIGHT: 158 LBS

## 2025-07-14 DIAGNOSIS — M17.11 PRIMARY OSTEOARTHRITIS OF RIGHT KNEE: Primary | ICD-10-CM

## 2025-07-14 PROCEDURE — 20610 DRAIN/INJ JOINT/BURSA W/O US: CPT | Performed by: INTERNAL MEDICINE

## 2025-07-21 ENCOUNTER — CLINICAL SUPPORT (OUTPATIENT)
Age: 75
End: 2025-07-21
Payer: COMMERCIAL

## 2025-07-21 DIAGNOSIS — M17.11 PRIMARY OSTEOARTHRITIS OF RIGHT KNEE: Primary | ICD-10-CM

## 2025-07-21 PROCEDURE — 20610 DRAIN/INJ JOINT/BURSA W/O US: CPT | Performed by: INTERNAL MEDICINE

## 2025-07-21 NOTE — PROGRESS NOTES
Large joint arthrocentesis: R knee    Performed by: Aracely Boone MD  Authorized by: Aracely Boone MD    Universal Protocol:  procedure performed by consultantConsent: Verbal consent obtained  Risks and benefits: risks, benefits and alternatives were discussed  Consent given by: patient  Timeout called at: 7/21/2025 1:22 PM.  Patient understanding: patient states understanding of the procedure being performed  Patient consent: the patient's understanding of the procedure matches consent given  Procedure consent: procedure consent matches procedure scheduled  Relevant documents: relevant documents present and verified  Test results: test results available and properly labeled  Site marked: the operative site was marked  Radiology Images displayed and confirmed. If images not available, report reviewed: imaging studies available  Patient identity confirmed: verbally with patient  Supporting Documentation  Indications: pain     Is this a Visco injection? Yes  Non-Pharmacologic Treatments Attempted: Home Exercise and Diet  Pharmacologic Treatments Attempted: Advil, Aleve, Tylenol  Pain Score: 7Procedure Details  Location: knee - R knee  Preparation: Patient was prepped and draped in the usual sterile fashion  Needle size: 20 G  Ultrasound guidance: no  Approach: medial  Medications administered: 20 mg Sodium Hyaluronate (Viscosup) 20 MG/2ML    Patient tolerance: patient tolerated the procedure well with no immediate complications  Dressing:  Sterile dressing applied     Told to ice 4 times a day for 4 days.

## 2025-07-28 ENCOUNTER — OFFICE VISIT (OUTPATIENT)
Age: 75
End: 2025-07-28
Payer: COMMERCIAL

## 2025-07-28 VITALS
BODY MASS INDEX: 29.44 KG/M2 | OXYGEN SATURATION: 97 % | HEART RATE: 61 BPM | TEMPERATURE: 98.7 F | SYSTOLIC BLOOD PRESSURE: 132 MMHG | WEIGHT: 160 LBS | DIASTOLIC BLOOD PRESSURE: 70 MMHG | HEIGHT: 62 IN

## 2025-07-28 DIAGNOSIS — M17.11 PRIMARY OSTEOARTHRITIS OF RIGHT KNEE: ICD-10-CM

## 2025-07-28 DIAGNOSIS — M79.7 FIBROMYALGIA: ICD-10-CM

## 2025-07-28 DIAGNOSIS — M48.062 SPINAL STENOSIS OF LUMBAR REGION WITH NEUROGENIC CLAUDICATION: ICD-10-CM

## 2025-07-28 DIAGNOSIS — D75.89 MACROCYTOSIS WITHOUT ANEMIA: ICD-10-CM

## 2025-07-28 DIAGNOSIS — M47.816 LUMBAR SPONDYLOSIS: ICD-10-CM

## 2025-07-28 DIAGNOSIS — M17.0 PRIMARY OSTEOARTHRITIS OF BOTH KNEES: ICD-10-CM

## 2025-07-28 DIAGNOSIS — M72.2 PLANTAR FASCIITIS: ICD-10-CM

## 2025-07-28 DIAGNOSIS — M85.89 OSTEOPENIA OF MULTIPLE SITES: ICD-10-CM

## 2025-07-28 DIAGNOSIS — I87.2 CHRONIC VENOUS INSUFFICIENCY: ICD-10-CM

## 2025-07-28 DIAGNOSIS — D72.819 CHRONIC LEUKOPENIA: ICD-10-CM

## 2025-07-28 DIAGNOSIS — M15.0 PRIMARY GENERALIZED (OSTEO)ARTHRITIS: Primary | ICD-10-CM

## 2025-07-28 PROCEDURE — 20610 DRAIN/INJ JOINT/BURSA W/O US: CPT | Performed by: INTERNAL MEDICINE

## 2025-07-28 PROCEDURE — 99214 OFFICE O/P EST MOD 30 MIN: CPT | Performed by: INTERNAL MEDICINE

## 2025-07-30 ENCOUNTER — TELEPHONE (OUTPATIENT)
Age: 75
End: 2025-07-30